# Patient Record
Sex: MALE | Race: OTHER | HISPANIC OR LATINO
[De-identification: names, ages, dates, MRNs, and addresses within clinical notes are randomized per-mention and may not be internally consistent; named-entity substitution may affect disease eponyms.]

---

## 2017-02-28 PROBLEM — Z00.00 ENCOUNTER FOR PREVENTIVE HEALTH EXAMINATION: Status: ACTIVE | Noted: 2017-02-28

## 2020-03-09 ENCOUNTER — APPOINTMENT (OUTPATIENT)
Dept: ORTHOPEDIC SURGERY | Facility: CLINIC | Age: 45
End: 2020-03-09
Payer: MEDICAID

## 2020-03-09 VITALS
OXYGEN SATURATION: 98 % | SYSTOLIC BLOOD PRESSURE: 129 MMHG | HEART RATE: 80 BPM | HEIGHT: 66 IN | DIASTOLIC BLOOD PRESSURE: 78 MMHG | WEIGHT: 210 LBS | BODY MASS INDEX: 33.75 KG/M2

## 2020-03-09 DIAGNOSIS — Z86.39 PERSONAL HISTORY OF OTHER ENDOCRINE, NUTRITIONAL AND METABOLIC DISEASE: ICD-10-CM

## 2020-03-09 DIAGNOSIS — Z87.2 PERSONAL HISTORY OF DISEASES OF THE SKIN AND SUBCUTANEOUS TISSUE: ICD-10-CM

## 2020-03-09 DIAGNOSIS — Z72.3 LACK OF PHYSICAL EXERCISE: ICD-10-CM

## 2020-03-09 DIAGNOSIS — Z78.9 OTHER SPECIFIED HEALTH STATUS: ICD-10-CM

## 2020-03-09 DIAGNOSIS — Z82.61 FAMILY HISTORY OF ARTHRITIS: ICD-10-CM

## 2020-03-09 DIAGNOSIS — F17.200 NICOTINE DEPENDENCE, UNSPECIFIED, UNCOMPLICATED: ICD-10-CM

## 2020-03-09 DIAGNOSIS — Z60.2 PROBLEMS RELATED TO LIVING ALONE: ICD-10-CM

## 2020-03-09 PROCEDURE — 99205 OFFICE O/P NEW HI 60 MIN: CPT | Mod: 25

## 2020-03-09 PROCEDURE — 72170 X-RAY EXAM OF PELVIS: CPT

## 2020-03-09 PROCEDURE — 73565 X-RAY EXAM OF KNEES: CPT

## 2020-03-09 PROCEDURE — 20610 DRAIN/INJ JOINT/BURSA W/O US: CPT | Mod: RT

## 2020-03-09 PROCEDURE — 73610 X-RAY EXAM OF ANKLE: CPT | Mod: RT

## 2020-03-09 SDOH — SOCIAL STABILITY - SOCIAL INSECURITY: PROBLEMS RELATED TO LIVING ALONE: Z60.2

## 2020-03-09 NOTE — PHYSICAL EXAM
[de-identified] : General appearance: well nourished and hydrated, pleasant, alert and oriented x 3, cooperative.  \par HEENT: normocephalic, EOM intact, nasal septum midline, oral cavity clear, external auditory canal clear.  \par Cardiovascular: no lower leg edema, no varicosities, dorsalis pedis pulses palpable and symmetric.  \par Lymphatics: no palpable lymphadenopathy, no lymphedema.  \par Neurologic: sensation is normal, no muscle weakness in upper or lower extremities, patella tendon reflexes present and symmetric.  \par Dermatologic: skin moist, warm. Scaly rash noted throughout right leg.  \par Spine: cervical spine with normal lordosis and painless range of motion, thoracic spine with normal kyphosis and painless range of motion, lumbosacral spine with normal lordosis and painless range of motion.\par Gait: normal.  \par \par Left knee:\par - Inspection: large effusion, negative soft tissue swelling, ecchymosis, and erythema.  \par - Wounds: healed arthroscopic portals, lateral parapatellar incision.\par - Alignment: normal.  \par - Palpation: medial joint line and peripatellar tenderness on palpation.  \par - ROM active: 0-110, pain on extremes of motion\par - Ligamentous laxity: negative Lachman, negative ant. drawer test, negative post. drawer test, negative pivot shift test, increased opening to varus stress with a firm endpoint, stable to valgus stress.  \par - Muscle Test: 5/5 quad strength.  \par \par Right knee:\par - Inspection: large effusion, negative soft tissue swelling, ecchymosis, and erythema.  \par - Wounds: healed arthroscopic portals.\par - Alignment: normal.  \par - Palpation: medial joint line and peripatellar tenderness on palpation.  \par - ROM active: 0-100, pain on extremes of motion\par - Ligamentous laxity: negative Lachman, negative ant. drawer test, negative post. drawer test, negative pivot shift test, stable to varus stress, slightly increased opening to valgus stress with a firm endpoint.  \par - Muscle Test: 5/5 quad strength.  \par \par Right ankle\par - Inspection: ankle effusion with negative swelling, ecchymosis, and erythema.  \par - Wounds: none.  \par - Palpation: joint line pain on palpation.  \par - ROM: FROM without crepitus.  \par - Strength: 5/5 plantarflexion and dorsiflexion.  \par - Stability: stable on anterior and posterior drawers, hindfoot inversion/eversion. [de-identified] : AP pelvis, 3 views of the right ankle (weightbearing AP, mortise, and lateral), and 4 views of the bilateral knees (weightbearing AP, weightbearing Moore, weightbearing lateral, and Sunrise) were obtained today and interpreted by me.\par \par Bilateral hips demonstrate normal alignment with mild superomedial joint space narrowing; no sclerosis/cysts/osteophytes. There is no proximal femoral or acetabular deformity. There is no fracture or prior fracture deformity. There is no radiographic evidence of osteonecrosis. There is a radiodense lesion in the left femoral metaphyseal region adjacent to the lesser trochanter,  possibly consistent with an osteofibrous or cartilaginous tumor.\par \par Bilateral sacroiliac joints appear normal without arthrosis.\par \par The left knee has a lateral unicondylar arthroplasty in place. The arthroplasty appears to be well fixed without evidence of fracture, osteolysis, or loosening. The knee demonstrates normal alignment in the coronal and sagittal planes. There is mild-moderate arthritis in the medial and patellofemoral compartments, Kellgren-Philippe 1-2. The patella sits at appropriate height and tracks centrally. Small lateral patellar osteophyte.\par \par The right knee demonstrates normal alignment in the coronal and sagittal planes. There is tricompartmental arthritis worse in the medial compartment, Kellgren-Philippe 3. There are cysts and sclerosis at the medial tibial plateau. The patella sits at appropriate height and tracks centrally. Small lateral patellar osteophyte.\par \par The right ankle demonstrates normal alignment without evidence of fracture or arthritis. Mortise appears normal. No syndesmotic widening. Minimal talonavicular arthrosis.

## 2020-03-09 NOTE — REASON FOR VISIT
[Initial Visit] : an initial visit for [Other: ____] : [unfilled] [ Service] : provided by  Service [FreeTextEntry1] : [id number] [FreeTextEntry2] : noman [TWNoteComboBox1] : [place]

## 2020-03-09 NOTE — PROCEDURE
[FreeTextEntry1] : Bilateral knee aspiration and right knee corticosteroid injection was performed. After preparation of superolateral portals with Betadine and alcohol, 18ga needles were used to withdraw approximately 40cc of blood-tinged synovial fluid from each knee. On the right side, the same needle was used to inject 9cc of 1% lidocaine and 1cc of Kenalog-40. The aspirates were sent for cell counts, cultures, and crystal analyses. Band-aids were applied. Patient tolerated the procedure well without complications.

## 2020-03-09 NOTE — REVIEW OF SYSTEMS
[Joint Pain] : joint pain [Urinary Frequency] : urinary frequency [Depression] : depression [Muscle Weakness] : muscle weakness [Negative] : Heme/Lymph

## 2020-03-09 NOTE — DISCUSSION/SUMMARY
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 Consider HA pending clinical progress.

## 2020-03-09 NOTE — HISTORY OF PRESENT ILLNESS
[Worsening] : worsening [6] : a current pain level of 6/10 [2] : a minimum pain level of 2/10 [10] : a maximum pain level of 10/10 [Bending] : worsened by bending [Direct Pressure] : worsened by direct pressure [Running] : worsened by running [Walking] : worsened by walking [Heat] : relieved by heat [Ice] : relieved by ice [NSAIDs] : relieved by nonsteroidal anti-inflammatory drugs [Rest] : relieved by rest [de-identified] : 45y/o male presenting for evaluation of polyarticular pain and swelling. These issues have been present for decades without specific injury or other inciting event. Issue lies chiefly with the knees as well as the right ankle. He has had four left knee arthroscopies as well as a left lateral unicondylar knee replacement in 2015 by Dr. Bowden. He had a right knee arthroscopy done "many years ago". None of the surgical procedures did anything to significantly relieve his symptoms. He has been through many rounds of CSI to both knees with limited temporary relief. The effusions always return within a couple of weeks. He has bilateral knee stiffness in flexion that has been present for about 5-6 years. The right ankle has swelling and pain that radiates to the third and fourth toes. He denies other joint involvement. He reports that he has been to see various doctors in various health systems and that a myriad of workup has all been negative. He specifically reports that Lyme testing has been negative. He is not 100% sure of the rest of the rheumatologic workup. He does have a history of psoriasis. He takes Aleve when needed for pain.\par \par Patient originally hails from [facility], currently works as a . Speaks some English but more comfortable with a Moroccan .\par \par Patient's name was incorrectly transcribed as "Edgard Lagunas"; correct spelling is Edgard Land. [de-identified] : sharp/burning/shooting

## 2020-03-10 LAB
B PERT IGG+IGM PNL SER: ABNORMAL
B PERT IGG+IGM PNL SER: ABNORMAL
COLOR FLD: NORMAL
COLOR FLD: NORMAL
FLUID INTAKE SUBSTANCE CLASS: NORMAL
FLUID INTAKE SUBSTANCE CLASS: NORMAL
LYMPHOCYTES # FLD MANUAL: 14 %
LYMPHOCYTES # FLD MANUAL: 16 %
MONOS+MACROS NFR FLD MANUAL: 18 %
MONOS+MACROS NFR FLD MANUAL: 20 %
NEUTS SEG # FLD MANUAL: 66 %
NEUTS SEG # FLD MANUAL: 66 %
RBC # FLD MANUAL: ABNORMAL /UL
RBC # FLD MANUAL: ABNORMAL /UL
SYCRY CLARITY: ABNORMAL
SYCRY CLARITY: ABNORMAL
SYCRY COLOR: ABNORMAL
SYCRY COLOR: ABNORMAL
SYCRY ID: ABNORMAL
SYCRY ID: ABNORMAL
SYCRY TUBE: NORMAL
SYCRY TUBE: NORMAL
TOTAL CELLS COUNTED FLD: 5990 /UL
TOTAL CELLS COUNTED FLD: 7043 /UL
TUBE TYPE: NORMAL
TUBE TYPE: NORMAL

## 2020-03-26 LAB
BACTERIA FLD CULT: NORMAL
BACTERIA FLD CULT: NORMAL

## 2020-04-06 ENCOUNTER — APPOINTMENT (OUTPATIENT)
Dept: ORTHOPEDIC SURGERY | Facility: CLINIC | Age: 45
End: 2020-04-06

## 2020-04-20 LAB
FUNGUS FLD CULT: NORMAL
FUNGUS FLD CULT: NORMAL

## 2020-05-18 ENCOUNTER — LABORATORY RESULT (OUTPATIENT)
Age: 45
End: 2020-05-18

## 2020-05-18 ENCOUNTER — APPOINTMENT (OUTPATIENT)
Dept: RHEUMATOLOGY | Facility: CLINIC | Age: 45
End: 2020-05-18
Payer: MEDICAID

## 2020-05-18 VITALS
OXYGEN SATURATION: 97 % | SYSTOLIC BLOOD PRESSURE: 110 MMHG | WEIGHT: 216 LBS | BODY MASS INDEX: 34.72 KG/M2 | TEMPERATURE: 98 F | RESPIRATION RATE: 12 BRPM | HEART RATE: 98 BPM | HEIGHT: 66 IN | DIASTOLIC BLOOD PRESSURE: 77 MMHG

## 2020-05-18 DIAGNOSIS — M13.0 POLYARTHRITIS, UNSPECIFIED: ICD-10-CM

## 2020-05-18 PROCEDURE — 99205 OFFICE O/P NEW HI 60 MIN: CPT

## 2020-05-19 NOTE — HISTORY OF PRESENT ILLNESS
[Arthralgias] : arthralgias [Joint Swelling] : joint swelling [Morning Stiffness] : morning stiffness [FreeTextEntry1] : 44 year old male presents for evaluation of bilateral knee pain. \par \par He has been having knee pain for many years - began with "wear and tear" of cartilage and then developed inflammation of both knees, had TKA approx. 4yrs ago in. L knee.\par Saw many orthopedists for evaluation of continued knee pain/swelling after surgery. Bilateral arthrocentesis by Dr. Fairchild 5/13 revealed CPPD crystals. He denied any pain relief from these, but states that the R knee felt better after having a depomedrol injection. Swelling recurred a few days later. \par \par Does not take anything for pain, rarely OTCs - ibuprofen/naprosyn will help. \par Used to take them more, but told by PCP that "one kidney is getting smaller" so he stopped this. \par +Stiffness in am\par +Swelling in knees only \par Had hyaluronic acid injections in knees approx 6mo ago but only had 2/3 injections  \par Has pain in achilles and under MTPs also. \par Sometimes base of CMC w/ pain \par \par + Psoriasis - lower back, scalp, \par \par No LBP \par No GI symptoms [Anorexia] : no anorexia [Weight Loss] : no weight loss [Malaise] : no malaise [Fever] : no fever [Fatigue] : no fatigue [Chills] : no chills [Malar Facial Rash] : no malar facial rash [Depression] : no depression [Skin Lesions] : no lesions [Skin Nodules] : no skin nodules [Oral Ulcers] : no oral ulcers [Cough] : no cough [Dysphagia] : no dysphagia [Dry Mouth] : no dry mouth [Shortness of Breath] : no shortness of breath [Chest Pain] : no chest pain [Joint Warmth] : no joint warmth [Joint Deformity] : no joint deformity [Decreased ROM] : no decreased range of motion [Falls] : no falls [Difficulty Standing] : no difficulty standing [Difficulty Walking] : no difficulty walking [Muscle Weakness] : no muscle weakness [Myalgias] : no myalgias [Muscle Spasms] : no muscle spasms [Visual Changes] : no visual changes [Muscle Cramping] : no muscle cramping [Eye Pain] : no eye pain [Eye Redness] : no eye redness [Dry Eyes] : no dry eyes

## 2020-05-19 NOTE — ASSESSMENT
[FreeTextEntry1] : 44 year old male presents for evaluation of bilateral knee pain. \par History of psoriasis also. \par Synovial fluid from knee aspirate reveals CPPD crystals in both knees, though they are extracellular - therefore there is no current inflammatory reaction to explain the pain. \par His achilles enthesitis, bilateral MTP pain/tenderness make a diagnosis of psoriatic arthritis likely. \par Check XRs, serologies and inflammatory markers today. \par DIscussed potential need for DMARD / biology therapy.

## 2020-05-19 NOTE — PHYSICAL EXAM
[General Appearance - Alert] : alert [General Appearance - In No Acute Distress] : in no acute distress [General Appearance - Well Nourished] : well nourished [General Appearance - Well Developed] : well developed [General Appearance - Well-Appearing] : healthy appearing [Sclera] : the sclera and conjunctiva were normal [Outer Ear] : the ears and nose were normal in appearance [Examination Of The Oral Cavity] : the lips and gums were normal [Nasal Cavity] : the nasal mucosa and septum were normal [Neck Appearance] : the appearance of the neck was normal [Respiration, Rhythm And Depth] : normal respiratory rhythm and effort [Auscultation Breath Sounds / Voice Sounds] : lungs were clear to auscultation bilaterally [Edema] : there was no peripheral edema [Bowel Sounds] : normal bowel sounds [] : no hepato-splenomegaly [Cervical Lymph Nodes Enlarged Posterior Bilaterally] : posterior cervical [Supraclavicular Lymph Nodes Enlarged Bilaterally] : supraclavicular [Cervical Lymph Nodes Enlarged Anterior Bilaterally] : anterior cervical [No Spinal Tenderness] : no spinal tenderness [Sensation] : the sensory exam was normal to light touch and pinprick [Motor Exam] : the motor exam was normal [Oriented To Time, Place, And Person] : oriented to person, place, and time [FreeTextEntry1] : psoriatic plaques in scalp and base of neck, back of R shin. No nail changes.

## 2020-05-21 DIAGNOSIS — R74.8 ABNORMAL LEVELS OF OTHER SERUM ENZYMES: ICD-10-CM

## 2020-05-21 LAB
ALBUMIN SERPL ELPH-MCNC: 4.6 G/DL
ALP BLD-CCNC: 80 U/L
ALT SERPL-CCNC: 23 U/L
ANA SER IF-ACNC: NEGATIVE
ANION GAP SERPL CALC-SCNC: 16 MMOL/L
AST SERPL-CCNC: 26 U/L
BASOPHILS # BLD AUTO: 0.04 K/UL
BASOPHILS NFR BLD AUTO: 0.4 %
BILIRUB SERPL-MCNC: 0.5 MG/DL
BUN SERPL-MCNC: 14 MG/DL
C TRACH RRNA SPEC QL NAA+PROBE: NOT DETECTED
CALCIUM SERPL-MCNC: 9.6 MG/DL
CALCIUM SERPL-MCNC: 9.6 MG/DL
CCP AB SER IA-ACNC: <8 UNITS
CHLORIDE SERPL-SCNC: 105 MMOL/L
CK SERPL-CCNC: 707 U/L
CO2 SERPL-SCNC: 22 MMOL/L
CREAT SERPL-MCNC: 0.92 MG/DL
CRP SERPL-MCNC: 1.78 MG/DL
EOSINOPHIL # BLD AUTO: 0.12 K/UL
EOSINOPHIL NFR BLD AUTO: 1.3 %
ERYTHROCYTE [SEDIMENTATION RATE] IN BLOOD BY WESTERGREN METHOD: 56 MM/HR
GLUCOSE SERPL-MCNC: 103 MG/DL
HCT VFR BLD CALC: 45.5 %
HGB BLD-MCNC: 14 G/DL
IGA SER QL IEP: 437 MG/DL
IGG SER QL IEP: 1472 MG/DL
IGM SER QL IEP: 89 MG/DL
IMM GRANULOCYTES NFR BLD AUTO: 0.3 %
LYMPHOCYTES # BLD AUTO: 2.1 K/UL
LYMPHOCYTES NFR BLD AUTO: 21.9 %
MAGNESIUM SERPL-MCNC: 2.2 MG/DL
MAN DIFF?: NORMAL
MCHC RBC-ENTMCNC: 29.8 PG
MCHC RBC-ENTMCNC: 30.8 GM/DL
MCV RBC AUTO: 96.8 FL
MONOCYTES # BLD AUTO: 0.66 K/UL
MONOCYTES NFR BLD AUTO: 6.9 %
N GONORRHOEA RRNA SPEC QL NAA+PROBE: NOT DETECTED
NEUTROPHILS # BLD AUTO: 6.62 K/UL
NEUTROPHILS NFR BLD AUTO: 69.2 %
PARATHYROID HORMONE INTACT: 19 PG/ML
PLATELET # BLD AUTO: 342 K/UL
POTASSIUM SERPL-SCNC: 4.3 MMOL/L
PROT SERPL-MCNC: 7.7 G/DL
RBC # BLD: 4.7 M/UL
RBC # FLD: 14.6 %
RF+CCP IGG SER-IMP: NEGATIVE
RHEUMATOID FACT SER QL: <10 IU/ML
SODIUM SERPL-SCNC: 142 MMOL/L
SOURCE AMPLIFICATION: NORMAL
TSH SERPL-ACNC: 3.04 UIU/ML
WBC # FLD AUTO: 9.57 K/UL

## 2020-06-05 LAB — HLA-B27 RELATED AG QL: NORMAL

## 2020-06-23 ENCOUNTER — APPOINTMENT (OUTPATIENT)
Dept: MRI IMAGING | Facility: HOSPITAL | Age: 45
End: 2020-06-23
Payer: MEDICAID

## 2020-06-23 ENCOUNTER — OUTPATIENT (OUTPATIENT)
Dept: OUTPATIENT SERVICES | Facility: HOSPITAL | Age: 45
LOS: 1 days | End: 2020-06-23
Payer: MEDICAID

## 2020-06-23 PROCEDURE — 73718 MRI LOWER EXTREMITY W/O DYE: CPT | Mod: 26,LT

## 2020-06-23 PROCEDURE — 73718 MRI LOWER EXTREMITY W/O DYE: CPT

## 2020-07-01 ENCOUNTER — APPOINTMENT (OUTPATIENT)
Dept: ORTHOPEDIC SURGERY | Facility: CLINIC | Age: 45
End: 2020-07-01
Payer: MEDICAID

## 2020-07-01 VITALS
DIASTOLIC BLOOD PRESSURE: 80 MMHG | HEIGHT: 66 IN | BODY MASS INDEX: 34.72 KG/M2 | OXYGEN SATURATION: 98 % | HEART RATE: 89 BPM | SYSTOLIC BLOOD PRESSURE: 122 MMHG | WEIGHT: 216 LBS

## 2020-07-01 DIAGNOSIS — M76.822 POSTERIOR TIBIAL TENDINITIS, LEFT LEG: ICD-10-CM

## 2020-07-01 PROCEDURE — 99214 OFFICE O/P EST MOD 30 MIN: CPT

## 2020-07-01 NOTE — PHYSICAL EXAM
[de-identified] : General appearance: well nourished and hydrated, pleasant, alert and oriented x 3, cooperative. \par HEENT: normocephalic, EOM intact, wearing mask, external auditory canal clear. \par Cardiovascular: no lower leg edema, no varicosities, dorsalis pedis pulses palpable and symmetric. \par Lymphatics: no palpable lymphadenopathy, no lymphedema. \par Neurologic: sensation is normal, no muscle weakness in upper or lower extremities, patella tendon reflexes present and symmetric. \par Dermatologic: skin moist, warm. No active rash.\par Spine: cervical spine with normal lordosis and painless range of motion, thoracic spine with normal kyphosis and painless range of motion, lumbosacral spine with normal lordosis and painless range of motion.\par Gait: normal. \par \par Left knee:\par - Inspection: large effusion, negative soft tissue swelling, ecchymosis, and erythema. \par - Wounds: healed arthroscopic portals, lateral parapatellar incision.\par - Alignment: normal. \par - Palpation: medial joint line and peripatellar tenderness on palpation. \par - ROM active: 0-110, pain on extremes of motion\par - Ligamentous laxity: negative Lachman, negative ant. drawer test, negative post. drawer test, negative pivot shift test, increased opening to varus stress with a firm endpoint, stable to valgus stress. \par - Muscle Test: 5/5 quad strength. \par \par Right knee:\par - Inspection: large effusion, negative soft tissue swelling, ecchymosis, and erythema. \par - Wounds: healed arthroscopic portals.\par - Alignment: normal. \par - Palpation: medial joint line and peripatellar tenderness on palpation. \par - ROM active: 0-100, pain on extremes of motion\par - Ligamentous laxity: negative Lachman, negative ant. drawer test, negative post. drawer test, negative pivot shift test, stable to varus stress, slightly increased opening to valgus stress with a firm endpoint. \par - Muscle Test: 5/5 quad strength. \par \par Left ankle: pain along course of posterior tibial tendon with visible synovitis. No elias pes planus deformity. Pain at PTT and lateral aspect subtalar joint with single leg heel raise.  [de-identified] : MRI of the left knee was reviewed from 6/23/20. 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

## 2020-07-01 NOTE — DISCUSSION/SUMMARY
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 Glover\par - Cont avoiding NSAIDs (kidney history), can use Tylenol as needed\par - Start PT, cont HEP\par - Reviewed the benefits of low impact exercise, daily ambulation\par - MRI right knee to evaluate for bilateral lipoma arborescens\par - Left UCBL and referral to Dr. Fay for the PTTI\par - Callback after MRI. If he has bilateral disease then would consider bilateral open synovectomy to hopefully arrest the progress of the disease. We discussed that this procedure would not address the degenerative changes that he has already developed, but could potentially slow its progression and allow for additional time prior to eventual TKA.

## 2020-07-01 NOTE — HISTORY OF PRESENT ILLNESS
[de-identified] : 46y/o male following up for bilateral knee inflammatory arthritis. Saw Dr. Glover whose impression was of psoriatic arthritis and started prednisone. She also obtained a left knee MRI that was done last week. He has been mostly noncompliant with the prednisone due to fear of possible side effects, though he states that all of his knee and ankle symptoms respond very well to the steroids when he does take them. The injection that we did at the last visit gave him roughly 4-5 weeks of relief. The bilateral knee effusions are back in full force. He has mild-moderate pain on a regular basis that does not limit his daily activities. He is asking about how much soccer is enough. There is also a new complaint of left posteromedial ankle pain.

## 2020-07-06 ENCOUNTER — APPOINTMENT (OUTPATIENT)
Dept: ORTHOPEDIC SURGERY | Facility: CLINIC | Age: 45
End: 2020-07-06
Payer: MEDICAID

## 2020-07-06 VITALS — BODY MASS INDEX: 34.72 KG/M2 | HEIGHT: 66 IN | RESPIRATION RATE: 16 BRPM | WEIGHT: 216 LBS

## 2020-07-06 DIAGNOSIS — M77.41 METATARSALGIA, RIGHT FOOT: ICD-10-CM

## 2020-07-06 DIAGNOSIS — M72.2 PLANTAR FASCIAL FIBROMATOSIS: ICD-10-CM

## 2020-07-06 DIAGNOSIS — M76.822 POSTERIOR TIBIAL TENDINITIS, LEFT LEG: ICD-10-CM

## 2020-07-06 DIAGNOSIS — M21.6X1 OTHER ACQUIRED DEFORMITIES OF RIGHT FOOT: ICD-10-CM

## 2020-07-06 DIAGNOSIS — M21.6X2 OTHER ACQUIRED DEFORMITIES OF LEFT FOOT: ICD-10-CM

## 2020-07-06 PROCEDURE — 99214 OFFICE O/P EST MOD 30 MIN: CPT

## 2020-07-06 RX ORDER — MELOXICAM 15 MG/1
15 TABLET ORAL
Qty: 30 | Refills: 2 | Status: ACTIVE | COMMUNITY
Start: 2020-07-06 | End: 1900-01-01

## 2020-07-06 RX ORDER — CETIRIZINE HYDROCHLORIDE 10 MG/1
10 TABLET, COATED ORAL
Qty: 30 | Refills: 0 | Status: ACTIVE | COMMUNITY
Start: 2020-05-21

## 2020-07-09 ENCOUNTER — APPOINTMENT (OUTPATIENT)
Dept: RHEUMATOLOGY | Facility: CLINIC | Age: 45
End: 2020-07-09
Payer: MEDICAID

## 2020-07-09 VITALS
TEMPERATURE: 99.3 F | DIASTOLIC BLOOD PRESSURE: 74 MMHG | SYSTOLIC BLOOD PRESSURE: 118 MMHG | BODY MASS INDEX: 34.63 KG/M2 | WEIGHT: 215.5 LBS | HEIGHT: 66 IN | HEART RATE: 85 BPM | OXYGEN SATURATION: 95 %

## 2020-07-09 PROCEDURE — 99214 OFFICE O/P EST MOD 30 MIN: CPT | Mod: 25

## 2020-07-09 PROCEDURE — 36415 COLL VENOUS BLD VENIPUNCTURE: CPT

## 2020-07-09 NOTE — PHYSICAL EXAM
[General Appearance - Alert] : alert [General Appearance - Well Nourished] : well nourished [General Appearance - In No Acute Distress] : in no acute distress [General Appearance - Well Developed] : well developed [General Appearance - Well-Appearing] : healthy appearing [Sclera] : the sclera and conjunctiva were normal [Nasal Cavity] : the nasal mucosa and septum were normal [Outer Ear] : the ears and nose were normal in appearance [Examination Of The Oral Cavity] : the lips and gums were normal [Neck Appearance] : the appearance of the neck was normal [Respiration, Rhythm And Depth] : normal respiratory rhythm and effort [Auscultation Breath Sounds / Voice Sounds] : lungs were clear to auscultation bilaterally [Bowel Sounds] : normal bowel sounds [Edema] : there was no peripheral edema [Cervical Lymph Nodes Enlarged Posterior Bilaterally] : posterior cervical [] : no hepato-splenomegaly [Cervical Lymph Nodes Enlarged Anterior Bilaterally] : anterior cervical [Supraclavicular Lymph Nodes Enlarged Bilaterally] : supraclavicular [No Spinal Tenderness] : no spinal tenderness [Motor Exam] : the motor exam was normal [Sensation] : the sensory exam was normal to light touch and pinprick [Oriented To Time, Place, And Person] : oriented to person, place, and time [FreeTextEntry1] : psoriatic plaques in scalp and base of neck, back of R shin. No nail changes.

## 2020-07-09 NOTE — ASSESSMENT
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 His lipoma arborescens is likely the cause of his knee pain and swelling, and ongoing Achilles tendon pain 2/2 PsA. \par Still unclear significance of elevated CK, will continue to trend. \par Await contralateral MRI of knee - following surgical remove of lipoma arborescens will consider therapy for PsA since this is the likely cause - Otezla will be the best option given predominantly peripheral joint involvement. \par Prednisone 10mg daily \par No NSAIDs while on steroids - voltaren gel ordered\par Labs today\par

## 2020-07-09 NOTE — HISTORY OF PRESENT ILLNESS
[Arthralgias] : arthralgias [Joint Swelling] : joint swelling [Morning Stiffness] : morning stiffness [FreeTextEntry1] : Initial Visit:\par He has been having knee pain for many years - began with "wear and tear" of cartilage and then developed inflammation of both knees, had TKA approx. 4yrs ago in. L knee.\par Saw many orthopedists for evaluation of continued knee pain/swelling after surgery. Bilateral arthrocentesis by Dr. Fairchild 5/13 revealed CPPD crystals. He denied any pain relief from these, but states that the R knee felt better after having a depomedrol injection. Swelling recurred a few days later. \par Does not take anything for pain, rarely OTCs - ibuprofen/naprosyn will help. \par Used to take them more, but told by PCP that "one kidney is getting smaller" so he stopped this. \par +Stiffness in am\par +Swelling in knees only \par Had hyaluronic acid injections in knees approx 6mo ago but only had 2/3 injections  \par Has pain in achilles and under MTPs also. \par Sometimes base of CMC w/ pain \par + Psoriasis - lower back, scalp, \par No LBP \par No GI symptoms\par Plan: History of psoriasis also. \par Synovial fluid from knee aspirate reveals CPPD crystals in both knees, though they are extracellular - therefore there is no current inflammatory reaction to explain the pain. \par His achilles enthesitis, bilateral MTP pain/tenderness make a diagnosis of psoriatic arthritis likely. \par Check XRs, serologies and inflammatory markers today. \par DIscussed potential need for DMARD / biology therapy.  [Anorexia] : no anorexia [Weight Loss] : no weight loss [Malaise] : no malaise [Fever] : no fever [Chills] : no chills [Fatigue] : no fatigue [Depression] : no depression [Malar Facial Rash] : no malar facial rash [Skin Lesions] : no lesions [Skin Nodules] : no skin nodules [Oral Ulcers] : no oral ulcers [Dry Mouth] : no dry mouth [Cough] : no cough [Shortness of Breath] : no shortness of breath [Dysphagia] : no dysphagia [Chest Pain] : no chest pain [Joint Warmth] : no joint warmth [Joint Deformity] : no joint deformity [Falls] : no falls [Decreased ROM] : no decreased range of motion [Difficulty Standing] : no difficulty standing [Difficulty Walking] : no difficulty walking [Myalgias] : no myalgias [Muscle Spasms] : no muscle spasms [Muscle Weakness] : no muscle weakness [Eye Pain] : no eye pain [Visual Changes] : no visual changes [Muscle Cramping] : no muscle cramping [Eye Redness] : no eye redness [Dry Eyes] : no dry eyes

## 2020-07-10 LAB
ALBUMIN SERPL ELPH-MCNC: 4.6 G/DL
ALP BLD-CCNC: 107 U/L
ALT SERPL-CCNC: 17 U/L
ANION GAP SERPL CALC-SCNC: 19 MMOL/L
AST SERPL-CCNC: 17 U/L
BASOPHILS # BLD AUTO: 0.04 K/UL
BASOPHILS NFR BLD AUTO: 0.4 %
BILIRUB SERPL-MCNC: 0.6 MG/DL
BUN SERPL-MCNC: 18 MG/DL
CALCIUM SERPL-MCNC: 9.5 MG/DL
CHLORIDE SERPL-SCNC: 106 MMOL/L
CK SERPL-CCNC: 145 U/L
CO2 SERPL-SCNC: 19 MMOL/L
CREAT SERPL-MCNC: 1.25 MG/DL
CRP SERPL-MCNC: 0.81 MG/DL
EOSINOPHIL # BLD AUTO: 0.19 K/UL
EOSINOPHIL NFR BLD AUTO: 1.7 %
ERYTHROCYTE [SEDIMENTATION RATE] IN BLOOD BY WESTERGREN METHOD: 52 MM/HR
GLUCOSE SERPL-MCNC: 98 MG/DL
HCT VFR BLD CALC: 50.9 %
HGB BLD-MCNC: 15.1 G/DL
IMM GRANULOCYTES NFR BLD AUTO: 0.8 %
LYMPHOCYTES # BLD AUTO: 2.75 K/UL
LYMPHOCYTES NFR BLD AUTO: 25 %
MAN DIFF?: NORMAL
MCHC RBC-ENTMCNC: 29.7 GM/DL
MCHC RBC-ENTMCNC: 30 PG
MCV RBC AUTO: 101 FL
MONOCYTES # BLD AUTO: 0.66 K/UL
MONOCYTES NFR BLD AUTO: 6 %
NEUTROPHILS # BLD AUTO: 7.29 K/UL
NEUTROPHILS NFR BLD AUTO: 66.1 %
PLATELET # BLD AUTO: 307 K/UL
POTASSIUM SERPL-SCNC: 4.4 MMOL/L
PROT SERPL-MCNC: 7.5 G/DL
RBC # BLD: 5.04 M/UL
RBC # FLD: 15.9 %
SODIUM SERPL-SCNC: 143 MMOL/L
WBC # FLD AUTO: 11.02 K/UL

## 2020-07-17 ENCOUNTER — APPOINTMENT (OUTPATIENT)
Dept: ORTHOPEDIC SURGERY | Facility: CLINIC | Age: 45
End: 2020-07-17
Payer: MEDICAID

## 2020-07-17 PROCEDURE — 99214 OFFICE O/P EST MOD 30 MIN: CPT

## 2020-07-17 NOTE — REVIEW OF SYSTEMS
[Feeling Tired] : feeling tired [Joint Stiffness] : joint stiffness [Feeling Weak] : feeling week [Nl] : Endocrine

## 2020-07-17 NOTE — DATA REVIEWED
[Imaging Present] : Present [de-identified] : X-rays done on the outside show bilateral knee arthritis with the left knee already having a unicondylar knee replacement.  The right side has degenerative arthritis with decreased joint space as well as significant \par subchondral cysts in the medial plateau with some osteophytes.\par \par MRI of the right knee from 7/9/2020 shows mild degeneration of the anterior horn medial meniscus and complete discoid lateral meniscus, moderate degenerative changes incomplete trabecular stress fracture, moderate to large joint effusion as well as significant lipoma arborescens.\par \par MRI of the left knee from 6/23/2020:\par IMPRESSION: Large joint effusion with synovitis and lipomatosis arborescens \par of the suprapatellar recess. \par \par Hemiarthroplasty of the lateral compartment.

## 2020-07-17 NOTE — DISCUSSION/SUMMARY
[All Questions Answered] : Patient (and family) had all questions answered to an agreeable level of satisfaction [Surgical risks reviewed] : Surgical risks reviewed [de-identified] : I had a long discussion with the patient through the  video.  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  He has it both in the front and back which is atypical however he also may have rheumatologic problem such as psoriatic arthritis.  In any event he wants to have this out open rather than arthroscopic.  We discussed arthroscopy being a small procedure with less recovery however it is less likely to do more resection.  We discussed that we cannot get everything from the front however the front would be more of a resection at this point and when he has his arthroplasty some point in the future he can have the back done as well.  Both knees hurt equally however he wants to start with the right knee.  This will get him back to driving soon on the left knee can be done whenever after a few months should he heal quickly.  He understands he still may have some pain and swelling afterwards however I think this will help get rid of the masses appropriately.  We will plan for surgery in the near future.\par \par \par If imaging was ordered, the patient was told to make an appointment to review findings right after all imaging is completed.\par \par We discussed risks, benefits and alternatives. Rationale of care was reviewed and all questions were answered. Patient (and family) had all questions answered to her degree of the level of satisfaction. Patient (and family) expressed understanding and interest in proceeding with the plan as outlined.\par \par \par \par \par This note was done with a voice recognition transcription software and any typos are related to this rather than medical error. Surgical risks reviewed. Patient (and family) had all questions answered to an agreeable level of satisfaction. Patient (and family) expressed understanding and interest in proceeding with the plan as outlined.  \par  [Interested in Proceeding] : Patient (and family) expressed understanding and interest in proceeding with the plan as outlined

## 2020-07-17 NOTE — HISTORY OF PRESENT ILLNESS
[FreeTextEntry1] : This is a 45-year-old gentleman who has a history of a left knee unicondylar arthroplasty because of significant pain in both knees.  He has had arthroscopy of both knees in the past however still having significant pain.  He was seen by an arthroplasty surgeon who had MRIs of both knees showing significant lipoma arborescens as well as arthritis.  He was sent to me for possible evaluation of the synovial process.  He has pain in both his knees with limited range of motion.  He has pain in the back of both knees as well as anteriorly.  He feels unsteady when he is walking.  He has recurrent effusions in both knees. [Stable] : stable [Bending] : worsened by bending [3] : currently ~his/her~ pain is 3 out of 10 [Direct Pressure] : worsened by direct pressure [Walking] : worsened by walking

## 2020-07-17 NOTE — REASON FOR VISIT
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 Fairchild.\par

## 2020-07-17 NOTE — PHYSICAL EXAM
[General Appearance - Well-Appearing] : Well appearing [General Appearance - Well Nourished] : well nourished [Neck Cervical Mass (___cm)] : no neck mass was observed [FreeTextEntry1] : Anxious about medical condition [Sclera] : the sclera and conjunctiva were normal [Heart Rate And Rhythm] : heart rate was normal and rhythm regular [Abdomen Soft] : Soft [] : No respiratory distress [Ataxic] : ataxic [Swelling] : swelling [Tenderness] : tenderness [Skin Changes - Describe changes:] : No skin changes noted [Incision Healed - Describe:] : Incision is healed ~M [Erythema] : no erythema [Full ROM Unless otherwise noted:] : Full range of motion unless otherwise noted: [LE  Motor Strength Normal unless otherwise noted:] : 5/5 strength in bilateral lower extemities unless otherwise noted. [Normal] : Sensation intact to light touch. [2+] : left 2+

## 2020-07-21 ENCOUNTER — APPOINTMENT (OUTPATIENT)
Dept: DISASTER EMERGENCY | Facility: CLINIC | Age: 45
End: 2020-07-21

## 2020-07-21 DIAGNOSIS — Z01.818 ENCOUNTER FOR OTHER PREPROCEDURAL EXAMINATION: ICD-10-CM

## 2020-07-22 LAB — SARS-COV-2 N GENE NPH QL NAA+PROBE: NOT DETECTED

## 2020-07-23 ENCOUNTER — TRANSCRIPTION ENCOUNTER (OUTPATIENT)
Age: 45
End: 2020-07-23

## 2020-07-24 ENCOUNTER — APPOINTMENT (OUTPATIENT)
Dept: ORTHOPEDIC SURGERY | Facility: AMBULATORY SURGERY CENTER | Age: 45
End: 2020-07-24

## 2020-07-24 ENCOUNTER — OUTPATIENT (OUTPATIENT)
Dept: OUTPATIENT SERVICES | Facility: HOSPITAL | Age: 45
LOS: 1 days | Discharge: HOME | End: 2020-07-24
Payer: MEDICAID

## 2020-07-24 ENCOUNTER — RESULT REVIEW (OUTPATIENT)
Age: 45
End: 2020-07-24

## 2020-07-24 PROCEDURE — 88305 TISSUE EXAM BY PATHOLOGIST: CPT | Mod: 26

## 2020-07-24 PROCEDURE — 27334 REMOVE KNEE JOINT LINING: CPT | Mod: RT

## 2020-08-03 DIAGNOSIS — E78.5 HYPERLIPIDEMIA, UNSPECIFIED: ICD-10-CM

## 2020-08-03 DIAGNOSIS — M65.861 OTHER SYNOVITIS AND TENOSYNOVITIS, RIGHT LOWER LEG: ICD-10-CM

## 2020-08-03 DIAGNOSIS — Z72.0 TOBACCO USE: ICD-10-CM

## 2020-08-03 LAB — SURGICAL PATHOLOGY STUDY: SIGNIFICANT CHANGE UP

## 2020-08-07 ENCOUNTER — APPOINTMENT (OUTPATIENT)
Dept: ORTHOPEDIC SURGERY | Facility: CLINIC | Age: 45
End: 2020-08-07
Payer: MEDICAID

## 2020-08-07 PROCEDURE — 99024 POSTOP FOLLOW-UP VISIT: CPT

## 2020-08-07 NOTE — HISTORY OF PRESENT ILLNESS
[___ Weeks Post Op] : [unfilled] weeks post op [3] : the patient reports pain that is 3/10 in severity [Chills] : no chills [Fever] : no fever [Slow Progress] : is progressing slowly [Adequate Pain Control] : has adequate pain control [de-identified] : 7/24/2020 - R knee anterior open synovectomy [de-identified] : Patient is still in significant pain but has significant swelling.  He has been moving around with the crutches.  He has had some itching as well.  He knows of no allergies in particular. [de-identified] : On exam he does have some minimal rash in the area possibly from the knee immobilizer.  The incision is clean dry and intact.  Steri-Strips were applied.  Range of motion is 0 to 50 degrees.  He has a significant effusion from hemarthrosis. [de-identified] : Pathology was synovium with marked reactive and degenerative changes,\par spotty hemosiderin deposition, lymphoid aggregates, and lobulated\par partially infarcted adipose tissue with focal chronic active\par inflammation. [de-identified] : 2 weeks postop from right knee synovectomy.  I attempted to aspirate some hematoma but is all clot is only 10 cc of old blood came out.  I want to start physical therapy in the meantime. [de-identified] : Physical therapy was written as was tramadol and Benadryl.  I will see him back in 4 weeks.  He is already going to be seeing his rheumatologist who can start him on some new medication.\par \par If imaging was ordered, the patient was told to make an appointment to review findings right after all imaging is completed.\par \par We discussed risks, benefits and alternatives. Rationale of care was reviewed and all questions were answered. Patient (and family) had all questions answered to her degree of the level of satisfaction. Patient (and family) expressed understanding and interest in proceeding with the plan as outlined.\par \par \par \par \par This note was done with a voice recognition transcription software and any typos are related to this rather than medical error. Surgical risks reviewed. Patient (and family) had all questions answered to an agreeable level of satisfaction. Patient (and family) expressed understanding and interest in proceeding with the plan as outlined.  \par

## 2020-08-20 ENCOUNTER — APPOINTMENT (OUTPATIENT)
Dept: RHEUMATOLOGY | Facility: CLINIC | Age: 45
End: 2020-08-20
Payer: MEDICAID

## 2020-08-20 VITALS
WEIGHT: 210 LBS | SYSTOLIC BLOOD PRESSURE: 127 MMHG | DIASTOLIC BLOOD PRESSURE: 73 MMHG | HEART RATE: 92 BPM | TEMPERATURE: 98.6 F | OXYGEN SATURATION: 98 % | HEIGHT: 66 IN | BODY MASS INDEX: 33.75 KG/M2

## 2020-08-20 PROCEDURE — 99214 OFFICE O/P EST MOD 30 MIN: CPT | Mod: 25

## 2020-08-20 PROCEDURE — 36415 COLL VENOUS BLD VENIPUNCTURE: CPT

## 2020-08-20 NOTE — PHYSICAL EXAM
[General Appearance - In No Acute Distress] : in no acute distress [General Appearance - Alert] : alert [General Appearance - Well-Appearing] : healthy appearing [General Appearance - Well Developed] : well developed [General Appearance - Well Nourished] : well nourished [Outer Ear] : the ears and nose were normal in appearance [Sclera] : the sclera and conjunctiva were normal [Examination Of The Oral Cavity] : the lips and gums were normal [Nasal Cavity] : the nasal mucosa and septum were normal [Respiration, Rhythm And Depth] : normal respiratory rhythm and effort [Neck Appearance] : the appearance of the neck was normal [Edema] : there was no peripheral edema [Bowel Sounds] : normal bowel sounds [Auscultation Breath Sounds / Voice Sounds] : lungs were clear to auscultation bilaterally [Cervical Lymph Nodes Enlarged Posterior Bilaterally] : posterior cervical [] : no hepato-splenomegaly [Cervical Lymph Nodes Enlarged Anterior Bilaterally] : anterior cervical [Supraclavicular Lymph Nodes Enlarged Bilaterally] : supraclavicular [No Spinal Tenderness] : no spinal tenderness [Oriented To Time, Place, And Person] : oriented to person, place, and time [Sensation] : the sensory exam was normal to light touch and pinprick [Motor Exam] : the motor exam was normal [FreeTextEntry1] : psoriatic plaques in scalp and base of neck, back of R shin improved from prior. No nail changes.

## 2020-08-20 NOTE — HISTORY OF PRESENT ILLNESS
[Arthralgias] : arthralgias [Joint Swelling] : joint swelling [Morning Stiffness] : morning stiffness [FreeTextEntry1] : Office Visit 7/9/20:\par Feels better on Prednisone - Skips some days though and then notes his pain is worse.\par Awaiting MRI L knee per Dr. 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 Reported association w/ PsA\par Feels about 50% better - still pain in Achilles tendon \par Plan: Doing well overall with improvement on Prednisone. His lipoma arborescens is likely the cause of his knee pain and swelling, and ongoing Achilles tendon pain 2/2 PsA. \par Still unclear significance of elevated CK, will continue to trend. \par Await contralateral MRI of knee - following surgical remove of lipoma arborescens will consider therapy for PsA since this is the likely cause - Otezla will be the best option given predominantly peripheral joint involvement. \par Prednisone 10mg daily \par No NSAIDs while on steroids - voltaren gel ordered\par Labs today\par \par Initial Visit:\par He has been having knee pain for many years - began with "wear and tear" of cartilage and then developed inflammation of both knees, had TKA approx. 4yrs ago in. L knee.\par Saw many orthopedists for evaluation of continued knee pain/swelling after surgery. Bilateral arthrocentesis by Dr. Fairchild 5/13 revealed CPPD crystals. He denied any pain relief from these, but states that the R knee felt better after having a depomedrol injection. Swelling recurred a few days later. \par Does not take anything for pain, rarely OTCs - ibuprofen/naprosyn will help. \par Used to take them more, but told by PCP that "one kidney is getting smaller" so he stopped this. \par +Stiffness in am\par +Swelling in knees only \par Had hyaluronic acid injections in knees approx 6mo ago but only had 2/3 injections  \par Has pain in achilles and under MTPs also. \par Sometimes base of CMC w/ pain \par + Psoriasis - lower back, scalp, \par No LBP \par No GI symptoms\par Plan: History of psoriasis also. \par Synovial fluid from knee aspirate reveals CPPD crystals in both knees, though they are extracellular - therefore there is no current inflammatory reaction to explain the pain. \par His achilles enthesitis, bilateral MTP pain/tenderness make a diagnosis of psoriatic arthritis likely. \par Check XRs, serologies and inflammatory markers today. \par DIscussed potential need for DMARD / biology therapy.  [Weight Loss] : no weight loss [Anorexia] : no anorexia [Malaise] : no malaise [Fever] : no fever [Chills] : no chills [Fatigue] : no fatigue [Depression] : no depression [Malar Facial Rash] : no malar facial rash [Skin Lesions] : no lesions [Skin Nodules] : no skin nodules [Oral Ulcers] : no oral ulcers [Cough] : no cough [Dry Mouth] : no dry mouth [Dysphagia] : no dysphagia [Shortness of Breath] : no shortness of breath [Chest Pain] : no chest pain [Joint Warmth] : no joint warmth [Joint Deformity] : no joint deformity [Decreased ROM] : no decreased range of motion [Falls] : no falls [Difficulty Standing] : no difficulty standing [Difficulty Walking] : no difficulty walking [Myalgias] : no myalgias [Muscle Spasms] : no muscle spasms [Muscle Weakness] : no muscle weakness [Muscle Cramping] : no muscle cramping [Visual Changes] : no visual changes [Eye Pain] : no eye pain [Eye Redness] : no eye redness [Dry Eyes] : no dry eyes

## 2020-08-20 NOTE — ASSESSMENT
[FreeTextEntry1] : 45 year old male presents for follow up of bilateral knee pain 2/2 lipoma arborescens and suspected PsA\par Now with new effusion s/p biopsy July 21 - will d/w Dr. Thomas \par Start Enbrel once knee is evaluated to rule out infection. Consider medrol dosepack also. \par Check hepatitis serologies and quantiferon today \par

## 2020-08-25 LAB
ALBUMIN SERPL ELPH-MCNC: 4.6 G/DL
ALP BLD-CCNC: 88 U/L
ALT SERPL-CCNC: 13 U/L
ANION GAP SERPL CALC-SCNC: 11 MMOL/L
AST SERPL-CCNC: 17 U/L
BASOPHILS # BLD AUTO: 0.04 K/UL
BASOPHILS NFR BLD AUTO: 0.4 %
BILIRUB SERPL-MCNC: 0.7 MG/DL
BUN SERPL-MCNC: 12 MG/DL
CALCIUM SERPL-MCNC: 9.7 MG/DL
CHLORIDE SERPL-SCNC: 102 MMOL/L
CK SERPL-CCNC: 98 U/L
CO2 SERPL-SCNC: 27 MMOL/L
CREAT SERPL-MCNC: 0.97 MG/DL
CRP SERPL-MCNC: 1.59 MG/DL
EOSINOPHIL # BLD AUTO: 0.24 K/UL
EOSINOPHIL NFR BLD AUTO: 2.6 %
ERYTHROCYTE [SEDIMENTATION RATE] IN BLOOD BY WESTERGREN METHOD: 48 MM/HR
GLUCOSE SERPL-MCNC: 158 MG/DL
HBV CORE IGG+IGM SER QL: NONREACTIVE
HBV SURFACE AB SER QL: NONREACTIVE
HBV SURFACE AG SER QL: NONREACTIVE
HCT VFR BLD CALC: 46 %
HCV AB SER QL: NONREACTIVE
HCV S/CO RATIO: 0.15 S/CO
HGB BLD-MCNC: 13.5 G/DL
IMM GRANULOCYTES NFR BLD AUTO: 0.4 %
LYMPHOCYTES # BLD AUTO: 2.48 K/UL
LYMPHOCYTES NFR BLD AUTO: 26.4 %
M TB IFN-G BLD-IMP: NEGATIVE
MAN DIFF?: NORMAL
MCHC RBC-ENTMCNC: 29.3 GM/DL
MCHC RBC-ENTMCNC: 29.6 PG
MCV RBC AUTO: 100.9 FL
MONOCYTES # BLD AUTO: 0.47 K/UL
MONOCYTES NFR BLD AUTO: 5 %
NEUTROPHILS # BLD AUTO: 6.12 K/UL
NEUTROPHILS NFR BLD AUTO: 65.2 %
PLATELET # BLD AUTO: 433 K/UL
POTASSIUM SERPL-SCNC: 4.5 MMOL/L
PROT SERPL-MCNC: 7.7 G/DL
QUANTIFERON TB PLUS MITOGEN MINUS NIL: 7.41 IU/ML
QUANTIFERON TB PLUS NIL: 0.01 IU/ML
QUANTIFERON TB PLUS TB1 MINUS NIL: 0.01 IU/ML
QUANTIFERON TB PLUS TB2 MINUS NIL: 0.01 IU/ML
RBC # BLD: 4.56 M/UL
RBC # FLD: 14.3 %
SODIUM SERPL-SCNC: 141 MMOL/L
WBC # FLD AUTO: 9.39 K/UL

## 2020-09-04 ENCOUNTER — APPOINTMENT (OUTPATIENT)
Dept: ORTHOPEDIC SURGERY | Facility: CLINIC | Age: 45
End: 2020-09-04
Payer: MEDICAID

## 2020-09-04 DIAGNOSIS — D17.20 BENIGN LIPOMATOUS NEOPLASM OF SKIN AND SUBCUTANEOUS TISSUE OF UNSPECIFIED LIMB: ICD-10-CM

## 2020-09-04 PROCEDURE — 99024 POSTOP FOLLOW-UP VISIT: CPT

## 2020-09-08 ENCOUNTER — APPOINTMENT (OUTPATIENT)
Dept: RHEUMATOLOGY | Facility: CLINIC | Age: 45
End: 2020-09-08
Payer: MEDICAID

## 2020-09-08 PROCEDURE — 99213 OFFICE O/P EST LOW 20 MIN: CPT

## 2020-09-08 NOTE — ASSESSMENT
[FreeTextEntry1] : 45 year old male presents for follow up of bilateral knee pain 2/2 lipoma arborescens and suspected PsA\par Discussed starting Enbrel today, showed patient how to administer \par

## 2020-09-08 NOTE — HISTORY OF PRESENT ILLNESS
[FreeTextEntry1] : Office Visit 8/20/20:\par Had biopsy for lipoma aborescens last month - Knee pain persists since surgery, notes new effusion developed in the past week \par Biopsy with non specific chronic inflammatory changes\par Psoriasis well controlled\par No other joint pain \par Discussed starting Enbrel today, side effects explained \par Plan: Now with new effusion s/p biopsy July 21 - will d/w Dr. Thomas \par Start Enbrel once knee is evaluated to rule out infection. Consider medrol dosepack also. \par Check hepatitis serologies and quantiferon today \par \par Office Visit 7/9/20:\par Feels better on Prednisone - Skips some days though and then notes his pain is worse.\par Awaiting MRI L knee per Dr. 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 Reported association w/ PsA\par Feels about 50% better - still pain in Achilles tendon \par Plan: Doing well overall with improvement on Prednisone. His lipoma arborescens is likely the cause of his knee pain and swelling, and ongoing Achilles tendon pain 2/2 PsA. \par Still unclear significance of elevated CK, will continue to trend. \par Await contralateral MRI of knee - following surgical remove of lipoma arborescens will consider therapy for PsA since this is the likely cause - Otezla will be the best option given predominantly peripheral joint involvement. \par Prednisone 10mg daily \par No NSAIDs while on steroids - voltaren gel ordered\par Labs today\par \par Initial Visit:\par He has been having knee pain for many years - began with "wear and tear" of cartilage and then developed inflammation of both knees, had TKA approx. 4yrs ago in. L knee.\par Saw many orthopedists for evaluation of continued knee pain/swelling after surgery. Bilateral arthrocentesis by Dr. Fairchild 5/13 revealed CPPD crystals. He denied any pain relief from these, but states that the R knee felt better after having a depomedrol injection. Swelling recurred a few days later. \par Does not take anything for pain, rarely OTCs - ibuprofen/naprosyn will help. \par Used to take them more, but told by PCP that "one kidney is getting smaller" so he stopped this. \par +Stiffness in am\par +Swelling in knees only \par Had hyaluronic acid injections in knees approx 6mo ago but only had 2/3 injections  \par Has pain in achilles and under MTPs also. \par Sometimes base of CMC w/ pain \par + Psoriasis - lower back, scalp, \par No LBP \par No GI symptoms\par Plan: History of psoriasis also. \par Synovial fluid from knee aspirate reveals CPPD crystals in both knees, though they are extracellular - therefore there is no current inflammatory reaction to explain the pain. \par His achilles enthesitis, bilateral MTP pain/tenderness make a diagnosis of psoriatic arthritis likely. \par Check XRs, serologies and inflammatory markers today. \par DIscussed potential need for DMARD / biology therapy.  [Weight Loss] : no weight loss [Anorexia] : no anorexia [Chills] : no chills [Fever] : no fever [Malaise] : no malaise [Depression] : no depression [Fatigue] : no fatigue [Malar Facial Rash] : no malar facial rash [Skin Lesions] : no lesions [Skin Nodules] : no skin nodules [Oral Ulcers] : no oral ulcers [Dysphagia] : no dysphagia [Dry Mouth] : no dry mouth [Cough] : no cough [Chest Pain] : no chest pain [Arthralgias] : arthralgias [Shortness of Breath] : no shortness of breath [Joint Warmth] : no joint warmth [Joint Deformity] : no joint deformity [Joint Swelling] : joint swelling [Decreased ROM] : no decreased range of motion [Morning Stiffness] : morning stiffness [Falls] : no falls [Difficulty Standing] : no difficulty standing [Myalgias] : no myalgias [Difficulty Walking] : no difficulty walking [Muscle Cramping] : no muscle cramping [Muscle Weakness] : no muscle weakness [Muscle Spasms] : no muscle spasms [Eye Pain] : no eye pain [Visual Changes] : no visual changes [Eye Redness] : no eye redness [Dry Eyes] : no dry eyes

## 2020-09-08 NOTE — PHYSICAL EXAM
[General Appearance - Well Nourished] : well nourished [General Appearance - Alert] : alert [General Appearance - In No Acute Distress] : in no acute distress [General Appearance - Well Developed] : well developed [General Appearance - Well-Appearing] : healthy appearing [Sclera] : the sclera and conjunctiva were normal [Outer Ear] : the ears and nose were normal in appearance [Examination Of The Oral Cavity] : the lips and gums were normal [Nasal Cavity] : the nasal mucosa and septum were normal [Neck Appearance] : the appearance of the neck was normal [Respiration, Rhythm And Depth] : normal respiratory rhythm and effort [Auscultation Breath Sounds / Voice Sounds] : lungs were clear to auscultation bilaterally [] : no hepato-splenomegaly [Bowel Sounds] : normal bowel sounds [Edema] : there was no peripheral edema [Cervical Lymph Nodes Enlarged Posterior Bilaterally] : posterior cervical [Cervical Lymph Nodes Enlarged Anterior Bilaterally] : anterior cervical [No Spinal Tenderness] : no spinal tenderness [Supraclavicular Lymph Nodes Enlarged Bilaterally] : supraclavicular [FreeTextEntry1] : R knee with warmth/tenderness and suprapatellar effusion, unable to extend  [Sensation] : the sensory exam was normal to light touch and pinprick [Oriented To Time, Place, And Person] : oriented to person, place, and time [Motor Exam] : the motor exam was normal

## 2020-09-11 PROBLEM — D17.20 BENIGN LIPOMATOUS NEOPLASM OF SKIN AND SUBCUTANEOUS TISSUE OF UNSPECIFIED LIMB: Status: ACTIVE | Noted: 2020-06-25

## 2020-09-11 NOTE — HISTORY OF PRESENT ILLNESS
[___ Weeks Post Op] : [unfilled] weeks post op [1] : the patient reports pain that is 1/10 in severity [Chills] : no chills [Fever] : no fever [Slow Progress] : is progressing slowly [Adequate Pain Control] : has adequate pain control [de-identified] : 7/24/2020 - R knee anterior open synovectomy [de-identified] : Patient is still in significant pain but decreased from before.  He also has significantly less swelling than before.  He is getting ready to start Enbrel. [de-identified] : On exam h his rash seems to have gone away.  Incision is clean dry and intact.  Range of motion is 0 to 80 degrees however I can push him to past 90.  He has significantly less effusion than before. [de-identified] : 6 weeks postop from right knee synovectomy.  He does not have anything to aspirate at this point.  I think he is free to start Enbrel.  He needs to do more physical therapy as he is still quite weak and stiff.  He understands if he does not do this he will get even stiffer.  He is still walking with a crutch and I have encouraged him to try and walk without as best as possible. [de-identified] : Continue physical therapy.  Follow-up in 6 weeks to 2 months.  He is already going to be seeing his rheumatologist who can start him on some new medication.\par \par If imaging was ordered, the patient was told to make an appointment to review findings right after all imaging is completed.\par \par We discussed risks, benefits and alternatives. Rationale of care was reviewed and all questions were answered. Patient (and family) had all questions answered to her degree of the level of satisfaction. Patient (and family) expressed understanding and interest in proceeding with the plan as outlined.\par \par \par \par \par This note was done with a voice recognition transcription software and any typos are related to this rather than medical error. Surgical risks reviewed. Patient (and family) had all questions answered to an agreeable level of satisfaction. Patient (and family) expressed understanding and interest in proceeding with the plan as outlined.  \par

## 2020-10-30 ENCOUNTER — APPOINTMENT (OUTPATIENT)
Dept: ORTHOPEDIC SURGERY | Facility: CLINIC | Age: 45
End: 2020-10-30
Payer: MEDICAID

## 2020-10-30 DIAGNOSIS — M25.661 STIFFNESS OF RIGHT KNEE, NOT ELSEWHERE CLASSIFIED: ICD-10-CM

## 2020-10-30 PROCEDURE — 99213 OFFICE O/P EST LOW 20 MIN: CPT

## 2020-10-30 PROCEDURE — 99072 ADDL SUPL MATRL&STAF TM PHE: CPT

## 2020-10-31 NOTE — DISCUSSION/SUMMARY
[All Questions Answered] : Patient (and family) had all questions answered to an agreeable level of satisfaction [Interested in Proceeding] : Patient (and family) expressed understanding and interest in proceeding with the plan as outlined [de-identified] : I discussed with the patient that I do not think that manipulation under anesthesia.  Compared to his other side he is about the same.  I doubt that he is going to get much better motion and he has right now unless his rheumatologist medicine starts working better.  He does have some issues with this that he is addressing with his rheumatologist.  I written him for more physical therapy.  I will see him back again in approximately 3 months.\par \par If imaging was ordered, the patient was told to make an appointment to review findings right after all imaging is completed.\par \par We discussed risks, benefits and alternatives. Rationale of care was reviewed and all questions were answered. Patient (and family) had all questions answered to her degree of the level of satisfaction. Patient (and family) expressed understanding and interest in proceeding with the plan as outlined.\par \par \par \par \par This note was done with a voice recognition transcription software and any typos are related to this rather than medical error. Surgical risks reviewed. Patient (and family) had all questions answered to an agreeable level of satisfaction. Patient (and family) expressed understanding and interest in proceeding with the plan as outlined.  \par

## 2020-10-31 NOTE — PHYSICAL EXAM
[General Appearance - Well-Appearing] : Well appearing [General Appearance - Well Nourished] : well nourished [FreeTextEntry1] : Anxious about medical condition [Sclera] : the sclera and conjunctiva were normal [Antalgic Gait Right] : antalgic on the right [Antalgic Gait Left] : antalgic on the left [Tenderness] : tenderness [Swelling] : no swelling [Erythema] : no erythema [Skin Changes - Describe changes:] : No skin changes noted [Incision Healed - Describe:] : Incision is healed ~M [Full ROM Unless otherwise noted:] : Full range of motion unless otherwise noted: [LE  Motor Strength Normal unless otherwise noted:] : 5/5 strength in bilateral lower extemities unless otherwise noted. [Normal] : Sensation intact to light touch. [2+] : left 2+

## 2020-10-31 NOTE — HISTORY OF PRESENT ILLNESS
[FreeTextEntry1] : Patient is back today to discuss both knees.  The right knee is still stiff and he gets from 3 degrees to 100 degrees.  He said that his physical therapist mentions him.  Manipulation under anesthesia.  I discussed with patient probably not the best idea because of his multiple other problems.  He has recently started Enbrel and has some complaints about this as well and he is discussing this with his rheumatologist.  He finds that his knee is stiff.  He is walking better than before and does not use an assistive device however does get stiff and pain often. [Improving] : improving [___ mths] : [unfilled] month(s) ago [3] : currently ~his/her~ pain is 3 out of 10 [Bending] : worsened by bending [Direct Pressure] : worsened by direct pressure [Joint Movement] : worsened by joint movement [None] : No relieving factors are noted

## 2020-10-31 NOTE — REVIEW OF SYSTEMS
[Feeling Tired] : feeling tired [Joint Stiffness] : joint stiffness [Feeling Weak] : feeling week [Nl] : Hematologic/Lymphatic

## 2020-11-10 ENCOUNTER — APPOINTMENT (OUTPATIENT)
Dept: RHEUMATOLOGY | Facility: CLINIC | Age: 45
End: 2020-11-10
Payer: MEDICAID

## 2020-11-10 VITALS
HEART RATE: 73 BPM | WEIGHT: 215 LBS | SYSTOLIC BLOOD PRESSURE: 113 MMHG | BODY MASS INDEX: 34.55 KG/M2 | TEMPERATURE: 98.8 F | DIASTOLIC BLOOD PRESSURE: 71 MMHG | HEIGHT: 66 IN | OXYGEN SATURATION: 99 %

## 2020-11-10 PROCEDURE — 99072 ADDL SUPL MATRL&STAF TM PHE: CPT

## 2020-11-10 PROCEDURE — 36415 COLL VENOUS BLD VENIPUNCTURE: CPT

## 2020-11-10 PROCEDURE — 99214 OFFICE O/P EST MOD 30 MIN: CPT | Mod: 25

## 2020-11-12 LAB
ALBUMIN SERPL ELPH-MCNC: 4.6 G/DL
ALP BLD-CCNC: 85 U/L
ALT SERPL-CCNC: 23 U/L
ANION GAP SERPL CALC-SCNC: 17 MMOL/L
AST SERPL-CCNC: 22 U/L
BASOPHILS # BLD AUTO: 0.03 K/UL
BASOPHILS NFR BLD AUTO: 0.4 %
BILIRUB SERPL-MCNC: 0.6 MG/DL
BUN SERPL-MCNC: 13 MG/DL
CALCIUM SERPL-MCNC: 9.5 MG/DL
CHLORIDE SERPL-SCNC: 104 MMOL/L
CO2 SERPL-SCNC: 18 MMOL/L
CREAT SERPL-MCNC: 0.96 MG/DL
CRP SERPL-MCNC: 0.37 MG/DL
EOSINOPHIL # BLD AUTO: 0.07 K/UL
EOSINOPHIL NFR BLD AUTO: 0.9 %
ERYTHROCYTE [SEDIMENTATION RATE] IN BLOOD BY WESTERGREN METHOD: 38 MM/HR
GLUCOSE SERPL-MCNC: 98 MG/DL
HCT VFR BLD CALC: 48.6 %
HGB BLD-MCNC: 15.4 G/DL
IMM GRANULOCYTES NFR BLD AUTO: 0.4 %
LYMPHOCYTES # BLD AUTO: 3.03 K/UL
LYMPHOCYTES NFR BLD AUTO: 39.3 %
MAN DIFF?: NORMAL
MCHC RBC-ENTMCNC: 29.8 PG
MCHC RBC-ENTMCNC: 31.7 GM/DL
MCV RBC AUTO: 94.2 FL
MONOCYTES # BLD AUTO: 0.44 K/UL
MONOCYTES NFR BLD AUTO: 5.7 %
NEUTROPHILS # BLD AUTO: 4.11 K/UL
NEUTROPHILS NFR BLD AUTO: 53.3 %
PLATELET # BLD AUTO: 304 K/UL
POTASSIUM SERPL-SCNC: 4.1 MMOL/L
PROT SERPL-MCNC: 8.1 G/DL
RBC # BLD: 5.16 M/UL
RBC # FLD: 15.1 %
SODIUM SERPL-SCNC: 139 MMOL/L
WBC # FLD AUTO: 7.71 K/UL

## 2020-11-25 NOTE — ASSESSMENT
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 Goldberg whether we should stop this now since Enbrel can increase the incidence of flares of anterior uveitis in patients with seronegative arthritis. \par Will refer to Dr. Naomi Goldberg also for formal evaluation. Hold Enbrel until next appt. \par Continue use of shampoo for psoriasis on scalp - though today there were no visible plaques on my exam\par Continue PT\par Check labs \par \par \par

## 2020-11-25 NOTE — HISTORY OF PRESENT ILLNESS
[Arthralgias] : arthralgias [Joint Swelling] : joint swelling [Morning Stiffness] : morning stiffness [FreeTextEntry1] : Office Visit 9/8/20:\par Presents today to start teaching for Enbrel \par KNee healing well. D/w Dr. 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 Thomas \par Start Enbrel once knee is evaluated to rule out infection. Consider medrol dosepack also. \par Check hepatitis serologies and quantiferon today \par \par Office Visit 7/9/20:\par Feels better on Prednisone - Skips some days though and then notes his pain is worse.\par Awaiting MRI L knee per Dr. 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 Reported association w/ PsA\par Feels about 50% better - still pain in Achilles tendon \par Plan: Doing well overall with improvement on Prednisone. His lipoma arborescens is likely the cause of his knee pain and swelling, and ongoing Achilles tendon pain 2/2 PsA. \par Still unclear significance of elevated CK, will continue to trend. \par Await contralateral MRI of knee - following surgical remove of lipoma arborescens will consider therapy for PsA since this is the likely cause - Otezla will be the best option given predominantly peripheral joint involvement. \par Prednisone 10mg daily \par No NSAIDs while on steroids - voltaren gel ordered\par Labs today\par \par Initial Visit:\par He has been having knee pain for many years - began with "wear and tear" of cartilage and then developed inflammation of both knees, had TKA approx. 4yrs ago in. L knee.\par Saw many orthopedists for evaluation of continued knee pain/swelling after surgery. Bilateral arthrocentesis by Dr. Fairchild 5/13 revealed CPPD crystals. He denied any pain relief from these, but states that the R knee felt better after having a depomedrol injection. Swelling recurred a few days later. \par Does not take anything for pain, rarely OTCs - ibuprofen/naprosyn will help. \par Used to take them more, but told by PCP that "one kidney is getting smaller" so he stopped this. \par +Stiffness in am\par +Swelling in knees only \par Had hyaluronic acid injections in knees approx 6mo ago but only had 2/3 injections  \par Has pain in achilles and under MTPs also. \par Sometimes base of CMC w/ pain \par + Psoriasis - lower back, scalp, \par No LBP \par No GI symptoms\par Plan: History of psoriasis also. \par Synovial fluid from knee aspirate reveals CPPD crystals in both knees, though they are extracellular - therefore there is no current inflammatory reaction to explain the pain. \par His achilles enthesitis, bilateral MTP pain/tenderness make a diagnosis of psoriatic arthritis likely. \par Check XRs, serologies and inflammatory markers today. \par DIscussed potential need for DMARD / biology therapy.  [Anorexia] : no anorexia [Weight Loss] : no weight loss [Malaise] : no malaise [Fever] : no fever [Chills] : no chills [Fatigue] : no fatigue [Depression] : no depression [Malar Facial Rash] : no malar facial rash [Skin Lesions] : no lesions [Skin Nodules] : no skin nodules [Oral Ulcers] : no oral ulcers [Cough] : no cough [Dry Mouth] : no dry mouth [Dysphagia] : no dysphagia [Shortness of Breath] : no shortness of breath [Chest Pain] : no chest pain [Joint Warmth] : no joint warmth [Joint Deformity] : no joint deformity [Decreased ROM] : no decreased range of motion [Falls] : no falls [Difficulty Standing] : no difficulty standing [Difficulty Walking] : no difficulty walking [Myalgias] : no myalgias [Muscle Weakness] : no muscle weakness [Muscle Spasms] : no muscle spasms [Muscle Cramping] : no muscle cramping [Visual Changes] : no visual changes [Eye Pain] : no eye pain [Eye Redness] : no eye redness [Dry Eyes] : no dry eyes

## 2020-12-01 ENCOUNTER — NON-APPOINTMENT (OUTPATIENT)
Age: 45
End: 2020-12-01

## 2020-12-01 ENCOUNTER — APPOINTMENT (OUTPATIENT)
Dept: OPHTHALMOLOGY | Facility: CLINIC | Age: 45
End: 2020-12-01
Payer: MEDICAID

## 2020-12-01 PROCEDURE — 99072 ADDL SUPL MATRL&STAF TM PHE: CPT

## 2020-12-01 PROCEDURE — 92004 COMPRE OPH EXAM NEW PT 1/>: CPT

## 2020-12-01 PROCEDURE — 92134 CPTRZ OPH DX IMG PST SGM RTA: CPT

## 2020-12-04 ENCOUNTER — APPOINTMENT (OUTPATIENT)
Dept: ORTHOPEDIC SURGERY | Facility: CLINIC | Age: 45
End: 2020-12-04

## 2020-12-22 ENCOUNTER — RX RENEWAL (OUTPATIENT)
Age: 45
End: 2020-12-22

## 2021-03-04 ENCOUNTER — APPOINTMENT (OUTPATIENT)
Dept: RHEUMATOLOGY | Facility: CLINIC | Age: 46
End: 2021-03-04
Payer: MEDICAID

## 2021-03-04 VITALS
BODY MASS INDEX: 35.52 KG/M2 | OXYGEN SATURATION: 97 % | TEMPERATURE: 97.5 F | HEIGHT: 66 IN | SYSTOLIC BLOOD PRESSURE: 103 MMHG | HEART RATE: 95 BPM | WEIGHT: 221 LBS | DIASTOLIC BLOOD PRESSURE: 69 MMHG

## 2021-03-04 PROCEDURE — 99214 OFFICE O/P EST MOD 30 MIN: CPT

## 2021-03-04 PROCEDURE — 99072 ADDL SUPL MATRL&STAF TM PHE: CPT

## 2021-03-05 ENCOUNTER — APPOINTMENT (OUTPATIENT)
Dept: ORTHOPEDIC SURGERY | Facility: CLINIC | Age: 46
End: 2021-03-05
Payer: MEDICAID

## 2021-03-05 DIAGNOSIS — M17.0 BILATERAL PRIMARY OSTEOARTHRITIS OF KNEE: ICD-10-CM

## 2021-03-05 DIAGNOSIS — M65.9 SYNOVITIS AND TENOSYNOVITIS, UNSPECIFIED: ICD-10-CM

## 2021-03-05 PROCEDURE — 99072 ADDL SUPL MATRL&STAF TM PHE: CPT

## 2021-03-05 PROCEDURE — 99213 OFFICE O/P EST LOW 20 MIN: CPT

## 2021-03-05 NOTE — HISTORY OF PRESENT ILLNESS
[FreeTextEntry1] : Patient is doing very well and is in significantly less pain than prior.  He is on the Enbrel and is tolerating this well.  He is seeing a new rheumatologist. [Stable] : stable [1] : currently ~his/her~ pain is 1 out of 10

## 2021-03-05 NOTE — PHYSICAL EXAM
[General Appearance - Alert] : alert [General Appearance - In No Acute Distress] : in no acute distress [General Appearance - Well-Appearing] : healthy appearing [Sclera] : the sclera and conjunctiva were normal [] : no respiratory distress [Respiration, Rhythm And Depth] : normal respiratory rhythm and effort [Exaggerated Use Of Accessory Muscles For Inspiration] : no accessory muscle use [Abnormal Walk] : normal gait [Oriented To Time, Place, And Person] : oriented to person, place, and time [Impaired Insight] : insight and judgment were intact [Affect] : the affect was normal [Mood] : the mood was normal [FreeTextEntry1] : +psoriasis

## 2021-03-05 NOTE — HISTORY OF PRESENT ILLNESS
[FreeTextEntry1] : Office Visit 9/8/20:\par Presents today to start teaching for Enbrel \par KNee healing well. D/w Dr. 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 Thomas \par Start Enbrel once knee is evaluated to rule out infection. Consider medrol dosepack also. \par Check hepatitis serologies and quantiferon today \par \par Office Visit 7/9/20:\par Feels better on Prednisone - Skips some days though and then notes his pain is worse.\par Awaiting MRI L knee per Dr. 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 Reported association w/ PsA\par Feels about 50% better - still pain in Achilles tendon \par Plan: Doing well overall with improvement on Prednisone. His lipoma arborescens is likely the cause of his knee pain and swelling, and ongoing Achilles tendon pain 2/2 PsA. \par Still unclear significance of elevated CK, will continue to trend. \par Await contralateral MRI of knee - following surgical remove of lipoma arborescens will consider therapy for PsA since this is the likely cause - Otezla will be the best option given predominantly peripheral joint involvement. \par Prednisone 10mg daily \par No NSAIDs while on steroids - voltaren gel ordered\par Labs today\par \par Initial Visit:\par He has been having knee pain for many years - began with "wear and tear" of cartilage and then developed inflammation of both knees, had TKA approx. 4yrs ago in. L knee.\par Saw many orthopedists for evaluation of continued knee pain/swelling after surgery. Bilateral arthrocentesis by Dr. Fairchild 5/13 revealed CPPD crystals. He denied any pain relief from these, but states that the R knee felt better after having a depomedrol injection. Swelling recurred a few days later. \par Does not take anything for pain, rarely OTCs - ibuprofen/naprosyn will help. \par Used to take them more, but told by PCP that "one kidney is getting smaller" so he stopped this. \par +Stiffness in am\par +Swelling in knees only \par Had hyaluronic acid injections in knees approx 6mo ago but only had 2/3 injections  \par Has pain in achilles and under MTPs also. \par Sometimes base of CMC w/ pain \par + Psoriasis - lower back, scalp, \par No LBP \par No GI symptoms\par Plan: History of psoriasis also. \par Synovial fluid from knee aspirate reveals CPPD crystals in both knees, though they are extracellular - therefore there is no current inflammatory reaction to explain the pain. \par His achilles enthesitis, bilateral MTP pain/tenderness make a diagnosis of psoriatic arthritis likely. \par Check XRs, serologies and inflammatory markers today. \par DIscussed potential need for DMARD / biology therapy.

## 2021-03-05 NOTE — ASSESSMENT
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  Patient is currently treated with enbrel for psoriatic arthritis  and psoriasis.  Was recently evaluated by dermatologist and recommended to adjust regimen, reviewed alternative regimens with patient, will start humira when returns from trip to [facility].  Will continue enbrel for now. Patient received first dose of covid vaccine, second dose pending, works as .  Reviewed side effects of humira with patient.  Reviewed hand washing, avoiding sick contacts, social distancing, facial coverings, and holding immunosuppressants if feeling unwell.

## 2021-03-05 NOTE — PHYSICAL EXAM
[FreeTextEntry1] : On exam the patient stands in good balance.  He is walking without any assistive device.  He is significantly better with less pain.  His right knee has no swelling.  Range of motion of 0 to 100 degrees.  Left knee has some mild swelling with range of motion of 0-115 degrees.  His other joints are not bothering him.  Is otherwise neurovascularly intact. [General Appearance - Well-Appearing] : Well appearing [General Appearance - Well Nourished] : well nourished [Antalgic Gait Right] : not antalgic on the right [Antalgic Gait Left] : not antalgic on the left [Tenderness] : no tenderness [Swelling] : no swelling [Erythema] : no erythema [Skin Changes - Describe changes:] : No skin changes noted [Incision Healed - Describe:] : Incision is healed ~M [Full ROM Unless otherwise noted:] : Full range of motion unless otherwise noted: [LE  Motor Strength Normal unless otherwise noted:] : 5/5 strength in bilateral lower extemities unless otherwise noted. [Normal] : Sensation intact to light touch. [2+] : left 2+

## 2021-03-05 NOTE — REVIEW OF SYSTEMS
[Joint Pain] : joint pain [Negative] : Heme/Lymph [FreeTextEntry9] : knees intermittently [de-identified] : psoriasis

## 2021-03-05 NOTE — DISCUSSION/SUMMARY
[All Questions Answered] : Patient (and family) had all questions answered to an agreeable level of satisfaction [Interested in Proceeding] : Patient (and family) expressed understanding and interest in proceeding with the plan as outlined [de-identified] : Overall patient has recovered significantly.  He still needs more physical therapy because of his lack of range of motion.  He is stiff in the right more than left knee and this may take a while to come back.  I recommended more physical therapy and written for his.  I want to see him again in 4 months time.  At that point we will get imaging as well for both total knee arthroplasties for follow-up.\par \par If imaging was ordered, the patient was told to make an appointment to review findings right after all imaging is completed.\par \par We discussed risks, benefits and alternatives. Rationale of care was reviewed and all questions were answered. Patient (and family) had all questions answered to her degree of the level of satisfaction. Patient (and family) expressed understanding and interest in proceeding with the plan as outlined.\par \par \par \par \par This note was done with a voice recognition transcription software and any typos are related to this rather than medical error. Surgical risks reviewed. Patient (and family) had all questions answered to an agreeable level of satisfaction. Patient (and family) expressed understanding and interest in proceeding with the plan as outlined.  \par

## 2021-03-05 NOTE — DATA REVIEWED
[FreeTextEntry1] : Surgical Final Report\par \par Final Diagnosis\par \par Right knee, synovectomy:\par - Synovium with marked reactive and degenerative changes,\par spotty hemosiderin deposition, lymphoid aggregates, and lobulated\par partially infarcted adipose tissue with focal chronic active\par inflammation.\par - GMS stain, negative for fungus (1A).\par \par Verified by: Flora Puente M.D.\par (Electronic Signature)\par Reported on: 08/03/20 11:36 EDT, [facility], [address] [place] [address]\par Phone: [phone]   Fax: [phone]\par _________________________________________________________________\par \par Clinical History\par Lipoma arborescence of right knee\par \par Specimen(s) Submitted\par 1     Right knee synovectomy\par \par Gross Description\par The specimen is received in formalin, labeled with the patient's\par identification and "right knee synovectomy."  It consists of one\par irregular piece and multiple fragments of brown-tan to yellow-tan\par and ragged to lobulated soft tissue measuring 11.7 x 5.9 x 2.4 cm\par in aggregate.  Sectioning reveals two yellow-tan, chalky foci\par measuring 0.6 and 0.4 cm in greatest dimension.  The remaining\par cut surfaces are yellow-tan to gray-pink, hemorrhagic, and\par lobulated to rubbery.  Representative sections are submitted in\par three cassettes, 1A-1C (1A-larger chalky focus; 1B-smaller chalky\par focus).\par Sandra Blum MS, PA([facility]) 07/28/2020 06:18\par EXAM: MR LWR EXT NON JOINT LT \par \par PROCEDURE DATE: 06/23/2020 \par \par \par \par INTERPRETATION: CLINICAL INDICATION: 45-year-old with weakness. \par \par \par \par \par FINDINGS: MRI of the left knee was performed in the axial, coronal and \par sagittal planes with proton density and fluid sensitive weighting with and \par without fat suppression. Reference is made to prior radiograph dated \par 2/24/2015. \par \par Evaluation of the knee demonstrates mucoid degeneration of the ACL, which is \par intact. The PCL, patellofemoral extensor mechanism and medial collateral \par ligament are normal in appearance. Evaluation of the lateral collateral \par ligament is limited secondary to artifact from hemiarthroplasty of the \par lateral compartment; within these limitations, the rectus femoris and \par fibular collateral ligaments are intact. Very large joint effusion with \par extensive intra-articular synovitis and fatty proliferation. Evaluation of \par the cartilage demonstrates fissuring of the lateral trochlea (image 13, \par axial). Surface irregularity and partial-thickness loss of the weightbearing \par medial femoral condyle; opposing medial tibial plateau is intact. Radial \par tearing of the medial meniscus. Postsurgical changes within Hoffa's fat. 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 \par \par Hemiarthroplasty of the lateral compartment. \par \par Labs quantiferon neg 8/2020\par Hep B neg\par

## 2021-04-21 RX ORDER — ETANERCEPT 50 MG/ML
50 SOLUTION SUBCUTANEOUS
Qty: 4 | Refills: 3 | Status: DISCONTINUED | COMMUNITY
Start: 2020-08-20 | End: 2021-04-21

## 2021-04-27 ENCOUNTER — APPOINTMENT (OUTPATIENT)
Dept: RHEUMATOLOGY | Facility: CLINIC | Age: 46
End: 2021-04-27
Payer: MEDICAID

## 2021-04-27 PROCEDURE — 99211 OFF/OP EST MAY X REQ PHY/QHP: CPT

## 2021-04-27 PROCEDURE — 99072 ADDL SUPL MATRL&STAF TM PHE: CPT

## 2021-05-13 ENCOUNTER — RX RENEWAL (OUTPATIENT)
Age: 46
End: 2021-05-13

## 2021-06-04 ENCOUNTER — APPOINTMENT (OUTPATIENT)
Dept: RHEUMATOLOGY | Facility: CLINIC | Age: 46
End: 2021-06-04

## 2021-06-11 ENCOUNTER — APPOINTMENT (OUTPATIENT)
Dept: RHEUMATOLOGY | Facility: CLINIC | Age: 46
End: 2021-06-11
Payer: MEDICAID

## 2021-06-11 VITALS
SYSTOLIC BLOOD PRESSURE: 112 MMHG | BODY MASS INDEX: 34.79 KG/M2 | WEIGHT: 216.5 LBS | TEMPERATURE: 97.8 F | HEART RATE: 67 BPM | HEIGHT: 66 IN | DIASTOLIC BLOOD PRESSURE: 67 MMHG | OXYGEN SATURATION: 98 %

## 2021-06-11 PROCEDURE — 99213 OFFICE O/P EST LOW 20 MIN: CPT | Mod: 25

## 2021-06-11 RX ORDER — TRAMADOL HYDROCHLORIDE 50 MG/1
50 TABLET, COATED ORAL
Qty: 60 | Refills: 0 | Status: DISCONTINUED | COMMUNITY
Start: 2020-08-13 | End: 2021-06-11

## 2021-06-11 RX ORDER — OXYCODONE AND ACETAMINOPHEN 5; 325 MG/1; MG/1
5-325 TABLET ORAL
Qty: 30 | Refills: 0 | Status: DISCONTINUED | COMMUNITY
Start: 2020-07-25 | End: 2021-06-11

## 2021-06-11 RX ORDER — IBUPROFEN 600 MG/1
600 TABLET, FILM COATED ORAL TWICE DAILY
Qty: 60 | Refills: 0 | Status: DISCONTINUED | COMMUNITY
Start: 2020-08-24 | End: 2021-06-11

## 2021-06-11 RX ORDER — PREDNISONE 5 MG/1
5 TABLET ORAL DAILY
Qty: 90 | Refills: 10 | Status: DISCONTINUED | COMMUNITY
Start: 2020-05-21 | End: 2021-06-11

## 2021-06-11 RX ORDER — DICLOFENAC SODIUM 10 MG/G
1 GEL TOPICAL DAILY
Qty: 1 | Refills: 2 | Status: DISCONTINUED | COMMUNITY
Start: 2020-07-09 | End: 2021-06-11

## 2021-06-11 RX ORDER — DIPHENHYDRAMINE HCL 25 MG/1
25 TABLET ORAL
Qty: 15 | Refills: 0 | Status: DISCONTINUED | COMMUNITY
Start: 2020-08-07 | End: 2021-06-11

## 2021-06-11 RX ORDER — TRAMADOL HYDROCHLORIDE 50 MG/1
50 TABLET, COATED ORAL
Qty: 60 | Refills: 0 | Status: DISCONTINUED | COMMUNITY
Start: 2020-08-07 | End: 2021-06-11

## 2021-06-11 RX ORDER — OXYCODONE 5 MG/1
5 TABLET ORAL
Qty: 30 | Refills: 0 | Status: DISCONTINUED | COMMUNITY
Start: 2020-07-24 | End: 2021-06-11

## 2021-06-11 NOTE — PHYSICAL EXAM
[General Appearance - In No Acute Distress] : in no acute distress [General Appearance - Alert] : alert [General Appearance - Well-Appearing] : healthy appearing [Sclera] : the sclera and conjunctiva were normal [] : no respiratory distress [Respiration, Rhythm And Depth] : normal respiratory rhythm and effort [Exaggerated Use Of Accessory Muscles For Inspiration] : no accessory muscle use [Abnormal Walk] : normal gait [Musculoskeletal - Swelling] : no joint swelling seen [Oriented To Time, Place, And Person] : oriented to person, place, and time [Affect] : the affect was normal [Mood] : the mood was normal [FreeTextEntry1] : minimal psoriasis behind the ear, more predominant on the scalp

## 2021-06-11 NOTE — DATA REVIEWED
[FreeTextEntry1] : Surgical Final Report\par \par Final Diagnosis\par \par Right knee, synovectomy:\par - Synovium with marked reactive and degenerative changes,\par spotty hemosiderin deposition, lymphoid aggregates, and lobulated\par partially infarcted adipose tissue with focal chronic active\par inflammation.\par - GMS stain, negative for fungus (1A).\par \par Verified by: Flora Puente M.D.\par (Electronic Signature)\par Reported on: 08/03/20 11:36 EDT, [facility], [address] [place] [address]\par Phone: [phone]   Fax: [phone]\par _________________________________________________________________\par \par Clinical History\par Lipoma arborescence of right knee\par \par Specimen(s) Submitted\par 1     Right knee synovectomy\par \par Gross Description\par The specimen is received in formalin, labeled with the patient's\par identification and "right knee synovectomy."  It consists of one\par irregular piece and multiple fragments of brown-tan to yellow-tan\par and ragged to lobulated soft tissue measuring 11.7 x 5.9 x 2.4 cm\par in aggregate.  Sectioning reveals two yellow-tan, chalky foci\par measuring 0.6 and 0.4 cm in greatest dimension.  The remaining\par cut surfaces are yellow-tan to gray-pink, hemorrhagic, and\par lobulated to rubbery.  Representative sections are submitted in\par three cassettes, 1A-1C (1A-larger chalky focus; 1B-smaller chalky\par focus).\par Sandra Blum MS, PA([facility]) 07/28/2020 06:18\par EXAM: MR LWR EXT NON JOINT LT \par \par PROCEDURE DATE: 06/23/2020 \par \par \par \par INTERPRETATION: CLINICAL INDICATION: 45-year-old with weakness. \par \par \par \par \par FINDINGS: MRI of the left knee was performed in the axial, coronal and \par sagittal planes with proton density and fluid sensitive weighting with and \par without fat suppression. Reference is made to prior radiograph dated \par 2/24/2015. \par \par Evaluation of the knee demonstrates mucoid degeneration of the ACL, which is \par intact. The PCL, patellofemoral extensor mechanism and medial collateral \par ligament are normal in appearance. Evaluation of the lateral collateral \par ligament is limited secondary to artifact from hemiarthroplasty of the \par lateral compartment; within these limitations, the rectus femoris and \par fibular collateral ligaments are intact. Very large joint effusion with \par extensive intra-articular synovitis and fatty proliferation. Evaluation of \par the cartilage demonstrates fissuring of the lateral trochlea (image 13, \par axial). Surface irregularity and partial-thickness loss of the weightbearing \par medial femoral condyle; opposing medial tibial plateau is intact. Radial \par tearing of the medial meniscus. Postsurgical changes within Hoffa's fat. 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 \par \par Hemiarthroplasty of the lateral compartment. \par \par Labs quantiferon neg 8/2020\par Hep B neg\par

## 2021-06-11 NOTE — HISTORY OF PRESENT ILLNESS
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 He feels this started after Enbrel. \par Other joints are feeling good\par Bilateral knee pain R>L, d/w Dr. Thomas at his last visit, who feels he is healing well \par Continues to work with PT\par \par March 4, 2021\par Patient returns for follow up\par Knee feeling better since surgery, has followup with orthopedist tomorrow.\par Was seen by dermatologist, requesting possible change in medication as enbrel helps joints, but still with psoriasis.  Psoriasis in ear, predominantly scalp and scattered throughout skin\par Dermatologist amy and Priscila\par Treats with cream for topical \par No side effects from medications\par No pain in the joints, intermittent right knee pain\par Will be traveling to [facility] following second vaccine dose.\par \par June 11, 2021\par Patient returns for follow up\par Patent feeling well, no joint pain at present.  Psoriasis improving but still persists on the scalp and behind the ears, although feels may have worsened with topical steroids.  \par No side effects from humira, completed five doses to date.

## 2021-06-11 NOTE — ASSESSMENT
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  Patient recently changed from  enbrel to humira (April 27, 2021) for psoriatic arthritis and psoriasis, with improvement.  would recommend follow up with dermatologist as feels kin may have worsened since starting topical steroids to rule out other etiology of dry skin in addition to psoriasis. Completed covid vaccine x 2 doses.  Will increase exercise as works as .  Reviewed side effects of humira with patient.  Reviewed hand washing, avoiding sick contacts, and holding immunosuppressants if feeling unwell.  Will update labs today in office.

## 2021-06-14 LAB
ALBUMIN SERPL ELPH-MCNC: 4.6 G/DL
ALP BLD-CCNC: 99 U/L
ALT SERPL-CCNC: 34 U/L
ANION GAP SERPL CALC-SCNC: 14 MMOL/L
AST SERPL-CCNC: 33 U/L
BASOPHILS # BLD AUTO: 0.04 K/UL
BASOPHILS NFR BLD AUTO: 0.4 %
BILIRUB SERPL-MCNC: 1.2 MG/DL
BUN SERPL-MCNC: 11 MG/DL
CALCIUM SERPL-MCNC: 9.8 MG/DL
CHLORIDE SERPL-SCNC: 100 MMOL/L
CO2 SERPL-SCNC: 25 MMOL/L
CREAT SERPL-MCNC: 1.06 MG/DL
CRP SERPL-MCNC: <3 MG/L
EOSINOPHIL # BLD AUTO: 0.11 K/UL
EOSINOPHIL NFR BLD AUTO: 1.2 %
ERYTHROCYTE [SEDIMENTATION RATE] IN BLOOD BY WESTERGREN METHOD: 16 MM/HR
GLUCOSE SERPL-MCNC: 101 MG/DL
HCT VFR BLD CALC: 47.1 %
HGB BLD-MCNC: 15.8 G/DL
IMM GRANULOCYTES NFR BLD AUTO: 0.4 %
LYMPHOCYTES # BLD AUTO: 3.64 K/UL
LYMPHOCYTES NFR BLD AUTO: 38.6 %
MAN DIFF?: NORMAL
MCHC RBC-ENTMCNC: 30.9 PG
MCHC RBC-ENTMCNC: 33.5 GM/DL
MCV RBC AUTO: 92.2 FL
MONOCYTES # BLD AUTO: 0.52 K/UL
MONOCYTES NFR BLD AUTO: 5.5 %
NEUTROPHILS # BLD AUTO: 5.08 K/UL
NEUTROPHILS NFR BLD AUTO: 53.9 %
PLATELET # BLD AUTO: 317 K/UL
POTASSIUM SERPL-SCNC: 4.3 MMOL/L
PROT SERPL-MCNC: 7.5 G/DL
RBC # BLD: 5.11 M/UL
RBC # FLD: 13.7 %
SODIUM SERPL-SCNC: 138 MMOL/L
WBC # FLD AUTO: 9.43 K/UL

## 2021-07-02 ENCOUNTER — OUTPATIENT (OUTPATIENT)
Dept: OUTPATIENT SERVICES | Facility: HOSPITAL | Age: 46
LOS: 1 days | End: 2021-07-02
Payer: MEDICAID

## 2021-07-02 ENCOUNTER — APPOINTMENT (OUTPATIENT)
Dept: ORTHOPEDIC SURGERY | Facility: CLINIC | Age: 46
End: 2021-07-02
Payer: MEDICAID

## 2021-07-02 ENCOUNTER — RESULT REVIEW (OUTPATIENT)
Age: 46
End: 2021-07-02

## 2021-07-02 PROCEDURE — 73562 X-RAY EXAM OF KNEE 3: CPT

## 2021-07-02 PROCEDURE — 73562 X-RAY EXAM OF KNEE 3: CPT | Mod: 26,LT,RT

## 2021-07-02 PROCEDURE — 99213 OFFICE O/P EST LOW 20 MIN: CPT

## 2021-07-02 NOTE — PHYSICAL EXAM
[FreeTextEntry1] : On exam both knees have healed incisions.  The range of motion on the right side is 0-100 on the left is 0-110.  He stable to varus valgus and anterior testing.  He has no obvious swelling on the right with minimal swelling on the left.  He has no areas of tenderness.  His quad strength is 4+ out of 5.  He is able to walk normally.  He is neurovascularly intact. [General Appearance - Well-Appearing] : Well appearing [General Appearance - Well Nourished] : well nourished [Oriented To Time, Place, And Person] : Oriented to person, place, and time [Impaired Insight] : Insight and judgment were intact [Affect] : The affect was normal. [Mood] : the mood was normal [Antalgic Gait Right] : not antalgic on the right [Antalgic Gait Left] : not antalgic on the left [Tenderness] : no tenderness [Swelling] : no swelling [Erythema] : no erythema [Skin Changes - Describe changes:] : No skin changes noted [Incision Healed - Describe:] : Incision is healed ~M [Full ROM Unless otherwise noted:] : Full range of motion unless otherwise noted: [LE  Motor Strength Normal unless otherwise noted:] : 5/5 strength in bilateral lower extemities unless otherwise noted. [Normal] : Sensation intact to light touch. [2+] : left 2+

## 2021-07-02 NOTE — HISTORY OF PRESENT ILLNESS
[FreeTextEntry1] : Patient has been working with her rheumatologist and currently started  Enbrel.  He is tolerating this well and is happy with his results.  Is significantly less knee pain.  He complains that he cannot get his muscle stronger however has not been doing much with therapy.  Psoriasis is improved but not completely gone. [Stable] : stable [1] : currently ~his/her~ pain is 1 out of 10 [Intermit.] : ~He/She~ states the symptoms seem to be intermittent [Bending] : worsened by bending [Direct Pressure] : worsened by direct pressure

## 2021-07-02 NOTE — DISCUSSION/SUMMARY
[All Questions Answered] : Patient (and family) had all questions answered to an agreeable level of satisfaction [Interested in Proceeding] : Patient (and family) expressed understanding and interest in proceeding with the plan as outlined [de-identified] : Patient is doing very well 1 year postop.  She does not seem to need any other further treatment right now.  I do not recommend any arthroplasty or any surgery for him.  He is getting continue with his medical treatment.  I want to see him again as needed.  He understands that this may come back and he may have degenerative changes in the future and may need further treatment.\par \par If imaging was ordered, the patient was told to make an appointment to review findings right after all imaging is completed.\par \par We discussed risks, benefits and alternatives. Rationale of care was reviewed and all questions were answered. Patient (and family) had all questions answered to her degree of the level of satisfaction. Patient (and family) expressed understanding and interest in proceeding with the plan as outlined.\par \par \par \par \par This note was done with a voice recognition transcription software and any typos are related to this rather than medical error. Surgical risks reviewed. Patient (and family) had all questions answered to an agreeable level of satisfaction. Patient (and family) expressed understanding and interest in proceeding with the plan as outlined.  \par

## 2021-07-23 ENCOUNTER — APPOINTMENT (OUTPATIENT)
Dept: RHEUMATOLOGY | Facility: CLINIC | Age: 46
End: 2021-07-23
Payer: MEDICAID

## 2021-07-23 ENCOUNTER — NON-APPOINTMENT (OUTPATIENT)
Age: 46
End: 2021-07-23

## 2021-07-23 VITALS
WEIGHT: 216 LBS | BODY MASS INDEX: 34.72 KG/M2 | HEIGHT: 66 IN | HEART RATE: 91 BPM | DIASTOLIC BLOOD PRESSURE: 59 MMHG | TEMPERATURE: 99.1 F | SYSTOLIC BLOOD PRESSURE: 103 MMHG | OXYGEN SATURATION: 98 %

## 2021-07-23 PROCEDURE — 99213 OFFICE O/P EST LOW 20 MIN: CPT

## 2021-07-23 NOTE — PHYSICAL EXAM
[General Appearance - Alert] : alert [General Appearance - In No Acute Distress] : in no acute distress [General Appearance - Well-Appearing] : healthy appearing [] : no respiratory distress [Respiration, Rhythm And Depth] : normal respiratory rhythm and effort [Exaggerated Use Of Accessory Muscles For Inspiration] : no accessory muscle use [Edema] : there was no peripheral edema [Abnormal Walk] : normal gait [Oriented To Time, Place, And Person] : oriented to person, place, and time [Impaired Insight] : insight and judgment were intact [Affect] : the affect was normal [Mood] : the mood was normal [FreeTextEntry1] : minimal psoriatic lesions diffusely on the extremities (improved)

## 2021-07-23 NOTE — HISTORY OF PRESENT ILLNESS
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 He feels this started after Enbrel. \par Other joints are feeling good\par Bilateral knee pain R>L, d/w Dr. Thomas at his last visit, who feels he is healing well \par Continues to work with PT\par \par March 4, 2021\par Patient returns for follow up\par Knee feeling better since surgery, has followup with orthopedist tomorrow.\par Was seen by dermatologist, requesting possible change in medication as enbrel helps joints, but still with psoriasis.  Psoriasis in ear, predominantly scalp and scattered throughout skin\par Dermatologist [facility] and [facility]\par Treats with cream for topical \par No side effects from medications\par No pain in the joints, intermittent right knee pain\par [facility].\par \par June 11, 2021\par Patient returns for follow up\par Patent feeling well, no joint pain at present.  Psoriasis improving but still persists on the scalp and behind the ears, although feels may have worsened with topical steroids.  \par No side effects from humira, completed five doses to date.\par \par July 23, 2021\par Patient returns for follow up \par Patient feeling well although had some left knee discomfort with swelling earlier in this week, now improved.  No NSAIDs, patient does not take additional medications for pain. \par Was seen by orthopedist in early July, stable, no Surgicel intervention at this time\par Reviewed labs with patient\par Patent also had follow up with dermatologist, had biopsy of scalp, no results to date.\par patient requests PT referral\par Reviewed labs with patient\par Reviewed recent xrays with patient.

## 2021-07-23 NOTE — ASSESSMENT
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  Patient recently changed from  enbrel to humira (April 27, 2021) for psoriatic arthritis and psoriasis, with improvement.  Had recent dermatology evaluation with scalp biopsy, will follow up for results and subsequent treatment.  Completed covid vaccine x 2 doses.  Will increase exercise as works as , patient requesting PT referral for the knees.  Discussed NSADs as needed for pain, patient has ibuprofen at home as needed.   Reviewed side effects of humira with patient.  Reviewed hand washing, avoiding sick contacts, and holding immunosuppressants if feeling unwell.  follow up in 3-4 months or sooner if symptoms change or worsen.

## 2021-07-23 NOTE — DATA REVIEWED
[FreeTextEntry1] : Surgical Final Report\par \par Final Diagnosis\par \par Right knee, synovectomy:\par - Synovium with marked reactive and degenerative changes,\par spotty hemosiderin deposition, lymphoid aggregates, and lobulated\par partially infarcted adipose tissue with focal chronic active\par inflammation.\par - GMS stain, negative for fungus (1A).\par \par Verified by: Flora Puente M.D.\par (Electronic Signature)\par Reported on: 08/03/20 11:36 EDT, [facility], [address] [place] [address]\par Phone: [phone]   Fax: [phone]\par _________________________________________________________________\par \par Clinical History\par Lipoma arborescence of right knee\par \par Specimen(s) Submitted\par 1     Right knee synovectomy\par \par Gross Description\par The specimen is received in formalin, labeled with the patient's\par identification and "right knee synovectomy."  It consists of one\par irregular piece and multiple fragments of brown-tan to yellow-tan\par and ragged to lobulated soft tissue measuring 11.7 x 5.9 x 2.4 cm\par in aggregate.  Sectioning reveals two yellow-tan, chalky foci\par measuring 0.6 and 0.4 cm in greatest dimension.  The remaining\par cut surfaces are yellow-tan to gray-pink, hemorrhagic, and\par lobulated to rubbery.  Representative sections are submitted in\par three cassettes, 1A-1C (1A-larger chalky focus; 1B-smaller chalky\par focus).\par Sandra Blum MS, PA([facility]) 07/28/2020 06:18\par EXAM: MR LWR EXT NON JOINT LT \par \par PROCEDURE DATE: 06/23/2020 \par \par \par \par INTERPRETATION: CLINICAL INDICATION: 45-year-old with weakness. \par \par \par \par \par FINDINGS: MRI of the left knee was performed in the axial, coronal and \par sagittal planes with proton density and fluid sensitive weighting with and \par without fat suppression. Reference is made to prior radiograph dated \par 2/24/2015. \par \par Evaluation of the knee demonstrates mucoid degeneration of the ACL, which is \par intact. The PCL, patellofemoral extensor mechanism and medial collateral \par ligament are normal in appearance. Evaluation of the lateral collateral \par ligament is limited secondary to artifact from hemiarthroplasty of the \par lateral compartment; within these limitations, the rectus femoris and \par fibular collateral ligaments are intact. Very large joint effusion with \par extensive intra-articular synovitis and fatty proliferation. Evaluation of \par the cartilage demonstrates fissuring of the lateral trochlea (image 13, \par axial). Surface irregularity and partial-thickness loss of the weightbearing \par medial femoral condyle; opposing medial tibial plateau is intact. Radial \par tearing of the medial meniscus. Postsurgical changes within Hoffa's fat. 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 \par \par Hemiarthroplasty of the lateral compartment. \par \par Labs quantiferon neg 8/2020\par Hep B neg\par

## 2021-08-02 ENCOUNTER — RX RENEWAL (OUTPATIENT)
Age: 46
End: 2021-08-02

## 2021-08-06 ENCOUNTER — APPOINTMENT (OUTPATIENT)
Dept: ORTHOPEDIC SURGERY | Facility: CLINIC | Age: 46
End: 2021-08-06
Payer: MEDICAID

## 2021-08-06 DIAGNOSIS — Z47.1 AFTERCARE FOLLOWING JOINT REPLACEMENT SURGERY: ICD-10-CM

## 2021-08-06 DIAGNOSIS — Z96.652 AFTERCARE FOLLOWING JOINT REPLACEMENT SURGERY: ICD-10-CM

## 2021-08-06 PROCEDURE — 20610 DRAIN/INJ JOINT/BURSA W/O US: CPT | Mod: LT

## 2021-08-06 PROCEDURE — 99213 OFFICE O/P EST LOW 20 MIN: CPT | Mod: 25

## 2021-08-06 NOTE — HISTORY OF PRESENT ILLNESS
[FreeTextEntry1] : In the past 2 weeks patient has started having some more pain in his left knee.  He was fine when I saw him before.  He recently changed rheumatologic drugs and finds that it is hurting more at this point.  He finds it with the humid weather especially he gets more pain.  He is here for evaluation of this and possible treatment. [Worsening] : worsening [___ wks] : [unfilled] week(s) ago [3] : currently ~his/her~ pain is 3 out of 10 [Bending] : worsened by bending [de-identified] : Standing

## 2021-08-06 NOTE — PROCEDURE
[Aspiration] : Aspiration [Left] : of the left [Knee Joint] : knee joint [Effusion] : Effusion [Joint Pain] : Joint pain [Bleeding] : bleeding [Patient] : patient [Risk] : risk [Benefits] : benefits [Alternatives] : alternatives [Verbal Consent Obtained] : verbal consent was obtained prior to the procedure [Alcohol] : Alcohol [Betadine] : Betadine [Ethyl Chloride Spray] : ethyl chloride spray was used as a topical anesthetic [Direct] : direct [1.5  inch] : 1.5 inch needle [___ mL Fluid] : [unfilled] mL of [Yellow] : yellow [Clear] : clear [1% Lidocaine___(mL)] : [unfilled] mL of 1% Lidocaine [Methylpred. 40mg/mL___(mL)] : [unfilled] mL of 40mg/mL methylprednisolone [Bandage Applied] : a bandage [Ace Wrap] : an ace wrap [Tolerated Well] : the patient tolerated the procedure well [None] : none [de-identified] : Synovitis [FreeTextEntry1] : Sterile prep even more than usual with presence of prosthesis [de-identified] : 16

## 2021-08-06 NOTE — DISCUSSION/SUMMARY
[All Questions Answered] : Patient (and family) had all questions answered to an agreeable level of satisfaction [Interested in Proceeding] : Patient (and family) expressed understanding and interest in proceeding with the plan as outlined [de-identified] : Patient is having some significant synovitis with effusion and pain.  This could be because you changed rheumatologic drugs or because of his usual problem.  My recommendation even though he has a prosthetic knee is to aspirate and inject.  We did this under strict sterile conditions.  The patient tolerated this and was feeling better afterwards.  He can follow-up with his rheumatologist to possibly change back to other medication or treat as per their team.  Follow-up as needed\par \par If imaging was ordered, the patient was told to make an appointment to review findings right after all imaging is completed.\par \par We discussed risks, benefits and alternatives. Rationale of care was reviewed and all questions were answered. Patient (and family) had all questions answered to her degree of the level of satisfaction. Patient (and family) expressed understanding and interest in proceeding with the plan as outlined.\par \par \par \par \par This note was done with a voice recognition transcription software and any typos are related to this rather than medical error. Surgical risks reviewed. Patient (and family) had all questions answered to an agreeable level of satisfaction. Patient (and family) expressed understanding and interest in proceeding with the plan as outlined.  \par

## 2021-08-06 NOTE — PHYSICAL EXAM
[FreeTextEntry1] : On exam patient stands in good balance.  He has bilateral knee swelling as it was with his synovitis.  The left knee is more swollen than the right.  Range of motion is 0 to 90 degrees.  His incision is clean dry and intact from his arthroplasty.  The right knee some mild swelling but no effusion the left knee has an obvious moderate effusion.  He stable to varus and valgus.  He is able to walk.  He has no erythema or warmth to suggest any sepsis.  He is neurovascularly intact. [General Appearance - Well-Appearing] : Well appearing [General Appearance - Well Nourished] : well nourished [Oriented To Time, Place, And Person] : Oriented to person, place, and time [Impaired Insight] : Insight and judgment were intact [Affect] : The affect was normal. [Mood] : the mood was normal [Sclera] : the sclera and conjunctiva were normal [Neck Cervical Mass (___cm)] : no neck mass was observed [Heart Rate And Rhythm] : heart rate was normal and rhythm regular [] : No respiratory distress [Abdomen Soft] : Soft [Normal Station and Gait] : gait and station were normal [Tenderness] : no tenderness [Swelling] : no swelling [Erythema] : no erythema [Skin Changes - Describe changes:] : No skin changes noted [Incision Healed - Describe:] : Incision is healed ~M [Full ROM Unless otherwise noted:] : Full range of motion unless otherwise noted: [LE  Motor Strength Normal unless otherwise noted:] : 5/5 strength in bilateral lower extemities unless otherwise noted. [Normal] : Sensation intact to light touch. [2+] : left 2+

## 2021-08-19 RX ORDER — ADALIMUMAB 40MG/0.4ML
40 KIT SUBCUTANEOUS
Qty: 1 | Refills: 2 | Status: DISCONTINUED | COMMUNITY
Start: 2021-03-05 | End: 2021-08-19

## 2021-11-23 ENCOUNTER — APPOINTMENT (OUTPATIENT)
Dept: RHEUMATOLOGY | Facility: CLINIC | Age: 46
End: 2021-11-23
Payer: MEDICAID

## 2021-11-23 VITALS
WEIGHT: 220 LBS | SYSTOLIC BLOOD PRESSURE: 118 MMHG | TEMPERATURE: 98.2 F | OXYGEN SATURATION: 97 % | HEIGHT: 66 IN | DIASTOLIC BLOOD PRESSURE: 69 MMHG | BODY MASS INDEX: 35.36 KG/M2 | HEART RATE: 127 BPM

## 2021-11-23 DIAGNOSIS — L65.9 NONSCARRING HAIR LOSS, UNSPECIFIED: ICD-10-CM

## 2021-11-23 PROCEDURE — 99214 OFFICE O/P EST MOD 30 MIN: CPT | Mod: 25

## 2021-11-23 RX ORDER — ETANERCEPT 50 MG/ML
50 SOLUTION SUBCUTANEOUS
Qty: 4 | Refills: 3 | Status: DISCONTINUED | COMMUNITY
Start: 2021-08-19 | End: 2021-11-23

## 2021-11-24 LAB
ALBUMIN SERPL ELPH-MCNC: 4.6 G/DL
ALP BLD-CCNC: 90 U/L
ALT SERPL-CCNC: 22 U/L
ANION GAP SERPL CALC-SCNC: 15 MMOL/L
AST SERPL-CCNC: 17 U/L
BASOPHILS # BLD AUTO: 0.04 K/UL
BASOPHILS NFR BLD AUTO: 0.4 %
BILIRUB SERPL-MCNC: 0.4 MG/DL
BUN SERPL-MCNC: 14 MG/DL
CALCIUM SERPL-MCNC: 9.4 MG/DL
CHLORIDE SERPL-SCNC: 104 MMOL/L
CO2 SERPL-SCNC: 20 MMOL/L
CREAT SERPL-MCNC: 1 MG/DL
CRP SERPL-MCNC: 4 MG/L
EOSINOPHIL # BLD AUTO: 0.08 K/UL
EOSINOPHIL NFR BLD AUTO: 0.9 %
ERYTHROCYTE [SEDIMENTATION RATE] IN BLOOD BY WESTERGREN METHOD: 36 MM/HR
GLUCOSE SERPL-MCNC: 94 MG/DL
HCT VFR BLD CALC: 47.8 %
HGB BLD-MCNC: 15.3 G/DL
IMM GRANULOCYTES NFR BLD AUTO: 0.4 %
LYMPHOCYTES # BLD AUTO: 3.67 K/UL
LYMPHOCYTES NFR BLD AUTO: 40.4 %
MAN DIFF?: NORMAL
MCHC RBC-ENTMCNC: 29.2 PG
MCHC RBC-ENTMCNC: 32 GM/DL
MCV RBC AUTO: 91.2 FL
MONOCYTES # BLD AUTO: 0.59 K/UL
MONOCYTES NFR BLD AUTO: 6.5 %
NEUTROPHILS # BLD AUTO: 4.67 K/UL
NEUTROPHILS NFR BLD AUTO: 51.4 %
PLATELET # BLD AUTO: 316 K/UL
POTASSIUM SERPL-SCNC: 4.2 MMOL/L
PROT SERPL-MCNC: 7.4 G/DL
RBC # BLD: 5.24 M/UL
RBC # FLD: 13.8 %
SODIUM SERPL-SCNC: 140 MMOL/L
WBC # FLD AUTO: 9.09 K/UL

## 2021-11-24 RX ORDER — ROSUVASTATIN CALCIUM 5 MG/1
5 TABLET, FILM COATED ORAL
Qty: 30 | Refills: 0 | Status: ACTIVE | COMMUNITY
Start: 2021-07-29

## 2021-11-24 RX ORDER — AMOXICILLIN 500 MG/1
500 CAPSULE ORAL
Qty: 4 | Refills: 0 | Status: COMPLETED | COMMUNITY
Start: 2021-11-11

## 2021-11-24 RX ORDER — CLOBETASOL PROPIONATE 0.5 MG/ML
0.05 SOLUTION TOPICAL
Qty: 50 | Refills: 0 | Status: ACTIVE | COMMUNITY
Start: 2021-06-24

## 2021-11-24 RX ORDER — AZELASTINE HYDROCHLORIDE 0.5 MG/ML
0.05 SOLUTION/ DROPS OPHTHALMIC
Qty: 6 | Refills: 0 | Status: COMPLETED | COMMUNITY
Start: 2021-07-29

## 2021-11-24 RX ORDER — KETOCONAZOLE 20.5 MG/ML
2 SHAMPOO, SUSPENSION TOPICAL
Qty: 120 | Refills: 0 | Status: ACTIVE | COMMUNITY
Start: 2021-07-29

## 2021-11-24 NOTE — ASSESSMENT
[FreeTextEntry1] : 46 year old male presents for follow up of psoriatic arthritis.  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  Patient recently changed from enbrel to humira (April 27, 2021) for psoriatic arthritis and psoriasis, with initial improvement, however, spoke with patient in August 2021 and requested to change back to enbrel with subsequent improvement in symptoms.  Patient would like to change to syringe form of enbrel. Will refer to dermatology for reevaluation as well, given alopecia, s/p previous scalp biopsy unclear results, will follow up for results and subsequent treatment.  Completed covid vaccine x 2 doses, will schedule booster dose. Discussed increasing exercise as works as .  Discussed NSAIDs as needed for pain, patient has ibuprofen at home as needed. Reviewed hand washing, avoiding sick contacts, and holding immunosuppressants if feeling unwell.  Will update labs today including quantiferon gold.  Follow up in 3-4 months or sooner if symptoms change or worsen.

## 2021-11-24 NOTE — HISTORY OF PRESENT ILLNESS
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 He feels this started after Enbrel. \par Other joints are feeling good\par Bilateral knee pain R>L, d/w Dr. Thomas at his last visit, who feels he is healing well \par Continues to work with PT\par \par March 4, 2021\par Patient returns for follow up\par Knee feeling better since surgery, has followup with orthopedist tomorrow.\par Was seen by dermatologist, requesting possible change in medication as enbrel helps joints, but still with psoriasis.  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  Psoriasis improving but still persists on the scalp and behind the ears, although feels may have worsened with topical steroids.  \par No side effects from humira, completed five doses to date.\par \par July 23, 2021\par Patient returns for follow up \par Patient feeling well although had some left knee discomfort with swelling earlier in this week, now improved.  No NSAIDs, patient does not take additional medications for pain. \par Was seen by orthopedist in early July, stable, no Surgicel intervention at this time\par Reviewed labs with patient\par Patent also had follow up with dermatologist, had biopsy of scalp, no results to date.\par patient requests PT referral\par Reviewed labs with patient\par Reviewed recent xrays with patient. \par \par November 23, 2021\par Patient returns for follow up\par Patient feeling well\par left knee with intermittent pain and swelling, not at present\par Trying to exercise more, but sits often as works as \par No recent infections\par Will arrange for booster\par Will be travelling to [facility] in January\par Patient would like to change back to Enbrel syringe as sometimes feels unsure if receives full dose with pen\par Patient concerned about alopecia as well

## 2021-11-24 NOTE — DATA REVIEWED
[FreeTextEntry1] : Surgical Final Report\par \par Final Diagnosis\par \par Right knee, synovectomy:\par - Synovium with marked reactive and degenerative changes,\par spotty hemosiderin deposition, lymphoid aggregates, and lobulated\par partially infarcted adipose tissue with focal chronic active\par inflammation.\par - GMS stain, negative for fungus (1A).\par \par Verified by: Flora Puente M.D.\par (Electronic Signature)\par Reported on: 08/03/20 11:36 EDT, [facility], [address] [place] [address]\par Phone: [phone]   Fax: [phone]\par _________________________________________________________________\par \par Clinical History\par Lipoma arborescence of right knee\par \par Specimen(s) Submitted\par 1     Right knee synovectomy\par \par Gross Description\par The specimen is received in formalin, labeled with the patient's\par identification and "right knee synovectomy."  It consists of one\par irregular piece and multiple fragments of brown-tan to yellow-tan\par and ragged to lobulated soft tissue measuring 11.7 x 5.9 x 2.4 cm\par in aggregate.  Sectioning reveals two yellow-tan, chalky foci\par measuring 0.6 and 0.4 cm in greatest dimension.  The remaining\par cut surfaces are yellow-tan to gray-pink, hemorrhagic, and\par lobulated to rubbery.  Representative sections are submitted in\par three cassettes, 1A-1C (1A-larger chalky focus; 1B-smaller chalky\par focus).\par Sandra Blum MS, PA([facility]) 07/28/2020 06:18\par EXAM: MR LWR EXT NON JOINT LT \par \par PROCEDURE DATE: 06/23/2020 \par \par \par \par INTERPRETATION: CLINICAL INDICATION: 45-year-old with weakness. \par \par \par \par \par FINDINGS: MRI of the left knee was performed in the axial, coronal and \par sagittal planes with proton density and fluid sensitive weighting with and \par without fat suppression. Reference is made to prior radiograph dated \par 2/24/2015. \par \par Evaluation of the knee demonstrates mucoid degeneration of the ACL, which is \par intact. The PCL, patellofemoral extensor mechanism and medial collateral \par ligament are normal in appearance. Evaluation of the lateral collateral \par ligament is limited secondary to artifact from hemiarthroplasty of the \par lateral compartment; within these limitations, the rectus femoris and \par fibular collateral ligaments are intact. Very large joint effusion with \par extensive intra-articular synovitis and fatty proliferation. Evaluation of \par the cartilage demonstrates fissuring of the lateral trochlea (image 13, \par axial). Surface irregularity and partial-thickness loss of the weightbearing \par medial femoral condyle; opposing medial tibial plateau is intact. Radial \par tearing of the medial meniscus. Postsurgical changes within Hoffa's fat. 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 \par \par Hemiarthroplasty of the lateral compartment. \par \par Labs quantiferon neg 8/2020\par Hep B neg\par

## 2021-11-24 NOTE — PHYSICAL EXAM
[General Appearance - Alert] : alert [General Appearance - In No Acute Distress] : in no acute distress [General Appearance - Well-Appearing] : healthy appearing [Sclera] : the sclera and conjunctiva were normal [] : no respiratory distress [Respiration, Rhythm And Depth] : normal respiratory rhythm and effort [Exaggerated Use Of Accessory Muscles For Inspiration] : no accessory muscle use [Edema] : there was no peripheral edema [Abnormal Walk] : normal gait [Nail Clubbing] : no clubbing  or cyanosis of the fingernails [Oriented To Time, Place, And Person] : oriented to person, place, and time [Impaired Insight] : insight and judgment were intact [Affect] : the affect was normal [FreeTextEntry1] : minimal left knee edema with good ROM, nontender

## 2021-11-28 LAB
M TB IFN-G BLD-IMP: NEGATIVE
QUANTIFERON TB PLUS MITOGEN MINUS NIL: 9.86 IU/ML
QUANTIFERON TB PLUS NIL: 0.07 IU/ML
QUANTIFERON TB PLUS TB1 MINUS NIL: -0.01 IU/ML
QUANTIFERON TB PLUS TB2 MINUS NIL: -0.02 IU/ML

## 2022-06-08 ENCOUNTER — RX RENEWAL (OUTPATIENT)
Age: 47
End: 2022-06-08

## 2022-07-01 ENCOUNTER — APPOINTMENT (OUTPATIENT)
Dept: RHEUMATOLOGY | Facility: CLINIC | Age: 47
End: 2022-07-01

## 2022-07-01 VITALS
OXYGEN SATURATION: 96 % | HEIGHT: 66 IN | BODY MASS INDEX: 35.84 KG/M2 | DIASTOLIC BLOOD PRESSURE: 69 MMHG | SYSTOLIC BLOOD PRESSURE: 115 MMHG | HEART RATE: 79 BPM | WEIGHT: 223 LBS | TEMPERATURE: 98.2 F

## 2022-07-01 PROCEDURE — 36415 COLL VENOUS BLD VENIPUNCTURE: CPT

## 2022-07-01 PROCEDURE — 99214 OFFICE O/P EST MOD 30 MIN: CPT | Mod: 25

## 2022-07-01 NOTE — PHYSICAL EXAM
[General Appearance - Alert] : alert [General Appearance - In No Acute Distress] : in no acute distress [General Appearance - Well Nourished] : well nourished [General Appearance - Well Developed] : well developed [General Appearance - Well-Appearing] : healthy appearing [Sclera] : the sclera and conjunctiva were normal [] : no respiratory distress [Respiration, Rhythm And Depth] : normal respiratory rhythm and effort [Exaggerated Use Of Accessory Muscles For Inspiration] : no accessory muscle use [Edema] : there was no peripheral edema [Abnormal Walk] : normal gait [Oriented To Time, Place, And Person] : oriented to person, place, and time [Impaired Insight] : insight and judgment were intact [Affect] : the affect was normal [Mood] : the mood was normal [FreeTextEntry1] : hyperpigmented area of scalp on the posterior scalp

## 2022-07-01 NOTE — ASSESSMENT
[FreeTextEntry1] : 47 year old male presents for follow up of psoriatic arthritis.  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  Patient previously changed from enbrel to humira (April 27, 2021) for psoriatic arthritis and psoriasis, with initial improvement, however, in August 2021 requested to change back to enbrel with subsequent improvement in symptoms.  Will refer to dermatology for reevaluation as well, given alopecia, s/p previous scalp biopsy unclear results, will follow up for results and subsequent treatment.  Completed covid vaccine x 2 doses, with booster. Discussed increasing exercise as works as .  Discussed NSAIDs as needed for pain, patient has ibuprofen at home as needed. Reviewed hand washing, avoiding sick contacts, and holding immunosuppressants if feeling unwell.  Will update labs today in office.  Follow up in 3-4 months or sooner if symptoms change or worsen.

## 2022-07-01 NOTE — DATA REVIEWED
[FreeTextEntry1] : Surgical Final Report\par \par Final Diagnosis\par \par Right knee, synovectomy:\par - Synovium with marked reactive and degenerative changes,\par spotty hemosiderin deposition, lymphoid aggregates, and lobulated\par partially infarcted adipose tissue with focal chronic active\par inflammation.\par - GMS stain, negative for fungus (1A).\par \par Verified by: Flora Puente M.D.\par (Electronic Signature)\par Reported on: 08/03/20 11:36 EDT, [facility], [address] [place] [address]\par Phone: [phone]   Fax: [phone]\par _________________________________________________________________\par \par Clinical History\par Lipoma arborescence of right knee\par \par Specimen(s) Submitted\par 1     Right knee synovectomy\par \par Gross Description\par The specimen is received in formalin, labeled with the patient's\par identification and "right knee synovectomy."  It consists of one\par irregular piece and multiple fragments of brown-tan to yellow-tan\par and ragged to lobulated soft tissue measuring 11.7 x 5.9 x 2.4 cm\par in aggregate.  Sectioning reveals two yellow-tan, chalky foci\par measuring 0.6 and 0.4 cm in greatest dimension.  The remaining\par cut surfaces are yellow-tan to gray-pink, hemorrhagic, and\par lobulated to rubbery.  Representative sections are submitted in\par three cassettes, 1A-1C (1A-larger chalky focus; 1B-smaller chalky\par focus).\par Sandra Blum MS, PA([facility]) 07/28/2020 06:18\par EXAM: MR LWR EXT NON JOINT LT \par \par PROCEDURE DATE: 06/23/2020 \par \par \par \par INTERPRETATION: CLINICAL INDICATION: 45-year-old with weakness. \par \par \par \par \par FINDINGS: MRI of the left knee was performed in the axial, coronal and \par sagittal planes with proton density and fluid sensitive weighting with and \par without fat suppression. Reference is made to prior radiograph dated \par 2/24/2015. \par \par Evaluation of the knee demonstrates mucoid degeneration of the ACL, which is \par intact. The PCL, patellofemoral extensor mechanism and medial collateral \par ligament are normal in appearance. Evaluation of the lateral collateral \par ligament is limited secondary to artifact from hemiarthroplasty of the \par lateral compartment; within these limitations, the rectus femoris and \par fibular collateral ligaments are intact. Very large joint effusion with \par extensive intra-articular synovitis and fatty proliferation. Evaluation of \par the cartilage demonstrates fissuring of the lateral trochlea (image 13, \par axial). Surface irregularity and partial-thickness loss of the weightbearing \par medial femoral condyle; opposing medial tibial plateau is intact. Radial \par tearing of the medial meniscus. Postsurgical changes within Hoffa's fat. 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 \par \par Hemiarthroplasty of the lateral compartment. \par \par Labs quantiferon neg 8/2020\par Hep B neg\par

## 2022-07-01 NOTE — DATA REVIEWED
[Imaging Present] : Present [de-identified] : X-rays today AP lateral sunrise view of both knees show the left knee but the lateral unicompartmental arthroplasty.  Other than that there is some scattered degenerative changes mostly at the lateral side of the right knee.  There is some joint space narrowing on the medial side but still is intact.  There are no other bony lesions seen.  There is no obvious soft tissue shadow seen. 20

## 2022-07-01 NOTE — HISTORY OF PRESENT ILLNESS
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 He feels this started after Enbrel. \par Other joints are feeling good\par Bilateral knee pain R>L, d/w Dr. Thomas at his last visit, who feels he is healing well \par Continues to work with PT\par \par March 4, 2021\par Patient returns for follow up\par Knee feeling better since surgery, has followup with orthopedist tomorrow.\par Was seen by dermatologist, requesting possible change in medication as enbrel helps joints, but still with psoriasis.  Psoriasis in ear, predominantly scalp and scattered throughout skin\par Dermatologist [address]\par Treats with cream for topical \par No side effects from medications\par No pain in the joints, intermittent right knee pain\par Will be traveling to [facility] following second vaccine dose.\par \par June 11, 2021\par Patient returns for follow up\par Patent feeling well, no joint pain at present.  Psoriasis improving but still persists on the scalp and behind the ears, although feels may have worsened with topical steroids.  \par No side effects from humira, completed five doses to date.\par \par July 23, 2021\par Patient returns for follow up \par Patient feeling well although had some left knee discomfort with swelling earlier in this week, now improved.  No NSAIDs, patient does not take additional medications for pain. \par Was seen by orthopedist in early July, stable, no Surgicel intervention at this time\par Reviewed labs with patient\par Patent also had follow up with dermatologist, had biopsy of scalp, no results to date.\par patient requests PT referral\par Reviewed labs with patient\par Reviewed recent xrays with patient. \par \par November 23, 2021\par Patient returns for follow up\par Patient feeling well\par left knee with intermittent pain and swelling, not at present\par Trying to exercise more, but sits often as works as \par No recent infections\par Will arrange for booster\par Will be travelling to [facility] in January\par Patient would like to change back to Enbrel syringe as sometimes feels unsure if receives full dose with pen\par Patient concerned about alopecia as well\par \par July 1, 2022\par Patient returns for follow up\par Patient feeling well, left knee ith pain or stiffness a this time.\par Continues enbrel 50 mg qweekly\par Trying to increase exercise but limited. \par No recent follow up with orthopedist., some intermittent right knee pain, but usually of short duration. \par +skin lesion on the posterior scalp, was not able to see dermatology following last visit, will update referral today for re evaluation. \par No recent infections, no history of covid infections.\par Will update labs today in office

## 2022-07-05 LAB
ALBUMIN SERPL ELPH-MCNC: 4.4 G/DL
ALP BLD-CCNC: 100 U/L
ALT SERPL-CCNC: 23 U/L
ANION GAP SERPL CALC-SCNC: 15 MMOL/L
AST SERPL-CCNC: 23 U/L
BASOPHILS # BLD AUTO: 0.05 K/UL
BASOPHILS NFR BLD AUTO: 0.6 %
BILIRUB SERPL-MCNC: 0.6 MG/DL
BUN SERPL-MCNC: 17 MG/DL
CALCIUM SERPL-MCNC: 9.2 MG/DL
CHLORIDE SERPL-SCNC: 104 MMOL/L
CO2 SERPL-SCNC: 21 MMOL/L
CREAT SERPL-MCNC: 1.03 MG/DL
CRP SERPL-MCNC: 3 MG/L
EGFR: 89 ML/MIN/1.73M2
EOSINOPHIL # BLD AUTO: 0.12 K/UL
EOSINOPHIL NFR BLD AUTO: 1.4 %
ERYTHROCYTE [SEDIMENTATION RATE] IN BLOOD BY WESTERGREN METHOD: 22 MM/HR
GLUCOSE SERPL-MCNC: 107 MG/DL
HCT VFR BLD CALC: 46.1 %
HGB BLD-MCNC: 15.4 G/DL
IMM GRANULOCYTES NFR BLD AUTO: 0.6 %
LYMPHOCYTES # BLD AUTO: 3.91 K/UL
LYMPHOCYTES NFR BLD AUTO: 46.7 %
MAN DIFF?: NORMAL
MCHC RBC-ENTMCNC: 31 PG
MCHC RBC-ENTMCNC: 33.4 GM/DL
MCV RBC AUTO: 92.8 FL
MONOCYTES # BLD AUTO: 0.66 K/UL
MONOCYTES NFR BLD AUTO: 7.9 %
NEUTROPHILS # BLD AUTO: 3.58 K/UL
NEUTROPHILS NFR BLD AUTO: 42.8 %
PLATELET # BLD AUTO: 292 K/UL
POTASSIUM SERPL-SCNC: 4 MMOL/L
PROT SERPL-MCNC: 7.6 G/DL
RBC # BLD: 4.97 M/UL
RBC # FLD: 13.8 %
SODIUM SERPL-SCNC: 140 MMOL/L
WBC # FLD AUTO: 8.37 K/UL

## 2022-08-15 ENCOUNTER — APPOINTMENT (OUTPATIENT)
Dept: OTOLARYNGOLOGY | Facility: CLINIC | Age: 47
End: 2022-08-15

## 2022-08-15 VITALS
WEIGHT: 223 LBS | TEMPERATURE: 97.2 F | DIASTOLIC BLOOD PRESSURE: 83 MMHG | HEIGHT: 66 IN | BODY MASS INDEX: 35.84 KG/M2 | SYSTOLIC BLOOD PRESSURE: 122 MMHG | HEART RATE: 69 BPM

## 2022-08-15 DIAGNOSIS — H92.09 OTALGIA, UNSPECIFIED EAR: ICD-10-CM

## 2022-08-15 PROCEDURE — 99204 OFFICE O/P NEW MOD 45 MIN: CPT

## 2022-08-15 NOTE — PHYSICAL EXAM
[FreeTextEntry1] : Ad: EAC clear, TM intact and mobile, ME clear\par As: EAC clar, some debris proximally removed w suction. TM intact and mobile, ME clear [Midline] : trachea located in midline position [de-identified] : crowded opx [Normal] : no rashes

## 2022-08-15 NOTE — ASSESSMENT
[FreeTextEntry1] : 47M here for initial evaluation. Over the past week, he developed left sided ear pain and itchiness. He saw PCP who said he had left sided ear infection and started oral abx/otic drops (augmentin and cortisporin). Since then, his sx have markedly improved and nearly completely resolved. He is here to ensure everything is ok. 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 On exam, some debris in the left EAC was removed w suction. The rest of the head, neck and ear exam is unremarkable.\par There is no sign of ear infection on exam today - just some left sided debris (residual?) and prior sx have mostly resolved. For now, finish full course meds (he is only on day 6) and keep ear dry. RTO 3 weeks for exam and will get audiometry at that time for baseline given h/o left sided SNHL.

## 2022-08-15 NOTE — HISTORY OF PRESENT ILLNESS
[de-identified] : 47M here for initial evaluation.\par \par Over the past week, he developed left sided ear pain and itchiness. He saw PCP who said he had ear infection and started abx/otic drops (augmentin and cortisporin). Since then, his sx have markedly improved and nearly completely resolved. 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

## 2022-09-15 ENCOUNTER — APPOINTMENT (OUTPATIENT)
Dept: OTOLARYNGOLOGY | Facility: CLINIC | Age: 47
End: 2022-09-15

## 2022-09-15 DIAGNOSIS — J30.89 OTHER ALLERGIC RHINITIS: ICD-10-CM

## 2022-09-15 PROCEDURE — 99214 OFFICE O/P EST MOD 30 MIN: CPT | Mod: 25

## 2022-09-15 PROCEDURE — 31231 NASAL ENDOSCOPY DX: CPT

## 2022-09-15 NOTE — HISTORY OF PRESENT ILLNESS
[de-identified] : 47M here in followup.\par \par He suffers from long standing allergic sx affecting his ear, nose and throat in the fall/spring and feels they are starting now w change in weather. He had been on allergy shots for 4 years with good sx control, but stopped them last year. He remains on zyrtec, flonase and singulair. 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

## 2022-09-15 NOTE — PHYSICAL EXAM
[Nasal Endoscopy Performed] : nasal endoscopy was performed, see procedure section for findings [Midline] : trachea located in midline position [Laryngoscopy Performed] : laryngoscopy was performed, see procedure section for findings [de-identified] : crowded opx, 2-3+ tonsils [Normal] : no rashes [FreeTextEntry1] : Au: EAC clear, TM intact and mobile, ME clear

## 2022-09-15 NOTE — ASSESSMENT
[FreeTextEntry1] : 47M here in followup. For years, he suffers from long standing allergic sx affecting his ear, nose and throat in the fall/spring and feels they are starting now w change in weather. He had been on allergy shots for 4 years with good sx control, but stopped them last year. He remains on zyrtec, flonase and singulair. 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 On exam, \par nasal endoscopy shows rightward septal deviation and inferior turbinate hypertrophy. The rest of the head and neck exam is unremarkable.\par Chronic rhinitis, turbinate hypertrophy and allergies. No e/o sinusitis and exam mostly unremarkable. Recommend continuing current meds and f/u w allergist to restart AIT.

## 2022-09-15 NOTE — PROCEDURE
[FreeTextEntry3] : Nasal Endoscopy:\par rightward septal deviation\par inferior turbinate hypertrophy\par no mucopus or polyps\par choana clear\par \par Fiberoptic Laryngoscopy:\par upper airway patent\par TVF symmetric and mobile\par no masses/lesions

## 2022-10-07 ENCOUNTER — APPOINTMENT (OUTPATIENT)
Dept: ORTHOPEDIC SURGERY | Facility: CLINIC | Age: 47
End: 2022-10-07

## 2022-10-07 VITALS
OXYGEN SATURATION: 96 % | WEIGHT: 223 LBS | BODY MASS INDEX: 35.84 KG/M2 | DIASTOLIC BLOOD PRESSURE: 73 MMHG | HEART RATE: 85 BPM | HEIGHT: 66 IN | SYSTOLIC BLOOD PRESSURE: 145 MMHG

## 2022-10-07 DIAGNOSIS — M25.661 STIFFNESS OF RIGHT KNEE, NOT ELSEWHERE CLASSIFIED: ICD-10-CM

## 2022-10-07 DIAGNOSIS — M25.662 STIFFNESS OF RIGHT KNEE, NOT ELSEWHERE CLASSIFIED: ICD-10-CM

## 2022-10-07 PROCEDURE — 99213 OFFICE O/P EST LOW 20 MIN: CPT

## 2022-10-07 NOTE — PHYSICAL EXAM
[FreeTextEntry1] : On exam the exam the patient is able to stand exam the patient is able to stand and walk.  He does not use any assistive device now.  Both knees have well-healed incisions.  Right knee has 0 to 105 degrees and left knee is 0 to 110 degrees.  He stable to varus and valgus testing.  He has a somewhat slowed gait but is normal.  He has some mild low back pain with no obvious radiculopathy.  He is neurovascularly intact. [General Appearance - Well-Appearing] : Well appearing [General Appearance - Well Nourished] : well nourished [Oriented To Time, Place, And Person] : Oriented to person, place, and time [Impaired Insight] : Insight and judgment were intact [Affect] : The affect was normal. [Mood] : the mood was normal [Sclera] : the sclera and conjunctiva were normal [Neck Cervical Mass (___cm)] : no neck mass was observed [Heart Rate And Rhythm] : heart rate was normal and rhythm regular [] : No respiratory distress [Abdomen Soft] : Soft [Normal Station and Gait] : gait and station were normal [Tenderness] : no tenderness [Swelling] : no swelling [Erythema] : no erythema [Skin Changes - Describe changes:] : No skin changes noted [Incision Healed - Describe:] : Incision is healed ~M [Full ROM Unless otherwise noted:] : Full range of motion unless otherwise noted: [LE  Motor Strength Normal unless otherwise noted:] : 5/5 strength in bilateral lower extemities unless otherwise noted. [Normal] : Sensation intact to light touch. [2+] : left 2+

## 2022-10-07 NOTE — DISCUSSION/SUMMARY
[All Questions Answered] : Patient (and family) had all questions answered to an agreeable level of satisfaction [Interested in Proceeding] : Patient (and family) expressed understanding and interest in proceeding with the plan as outlined [de-identified] : Patient continues to improve with his rheumatologic drugs.  He asked how long he would need to be on but I think this is most likely a decision with rheumatology.  He does have a chronic situation that makes him stiff and I think the medication is helping him.  I like to see him again in 1 year or as needed.  He asked about physical therapy which I think would be appropriate for both his knees and his back referring him for this as well.\par \par If imaging was ordered, the patient was told to make an appointment to review findings right after all imaging is completed.\par \par We discussed risks, benefits and alternatives. Rationale of care was reviewed and all questions were answered. Patient (and family) had all questions answered to her degree of the level of satisfaction. Patient (and family) expressed understanding and interest in proceeding with the plan as outlined.\par \par \par \par \par This note was done with a voice recognition transcription software and any typos are related to this rather than medical error. Surgical risks reviewed. Patient (and family) had all questions answered to an agreeable level of satisfaction. Patient (and family) expressed understanding and interest in proceeding with the plan as outlined.  \par

## 2022-10-07 NOTE — HISTORY OF PRESENT ILLNESS
[FreeTextEntry1] : Patient has been doing well with rheumatology.  I have not seen him in over a year.  He has been able to move appropriately.  He has minimal occasional pain but nothing like he did before. [Stable] : stable [1] : currently ~his/her~ pain is 1 out of 10

## 2022-10-07 NOTE — REASON FOR VISIT
[FreeTextEntry1] : 7/24/2020 - R knee anterior open synovectomy. \par Continued follow-up with knees

## 2023-02-10 ENCOUNTER — RX RENEWAL (OUTPATIENT)
Age: 48
End: 2023-02-10

## 2023-05-10 NOTE — END OF VISIT
Cslled and spoke with patient to tell him to get labs and follow up appt Patient verbalized understanding   [Time Spent: ___ minutes] : I have spent [unfilled] minutes of time on the encounter. [>50% of the face to face encounter time was spent on counseling and/or coordination of care for ___] : Greater than 50% of the face to face encounter time was spent on counseling and/or coordination of care for [unfilled]

## 2023-05-23 ENCOUNTER — RX RENEWAL (OUTPATIENT)
Age: 48
End: 2023-05-23

## 2023-09-13 ENCOUNTER — RX RENEWAL (OUTPATIENT)
Age: 48
End: 2023-09-13

## 2023-11-02 ENCOUNTER — RX RENEWAL (OUTPATIENT)
Age: 48
End: 2023-11-02

## 2023-12-26 ENCOUNTER — RX RENEWAL (OUTPATIENT)
Age: 48
End: 2023-12-26

## 2024-02-14 ENCOUNTER — RX RENEWAL (OUTPATIENT)
Age: 49
End: 2024-02-14

## 2024-03-05 ENCOUNTER — APPOINTMENT (OUTPATIENT)
Dept: RHEUMATOLOGY | Facility: CLINIC | Age: 49
End: 2024-03-05
Payer: MEDICAID

## 2024-03-05 VITALS
HEIGHT: 66 IN | DIASTOLIC BLOOD PRESSURE: 73 MMHG | OXYGEN SATURATION: 97 % | HEART RATE: 86 BPM | SYSTOLIC BLOOD PRESSURE: 112 MMHG | TEMPERATURE: 97.6 F | WEIGHT: 218 LBS | BODY MASS INDEX: 35.03 KG/M2

## 2024-03-05 DIAGNOSIS — L40.9 PSORIASIS, UNSPECIFIED: ICD-10-CM

## 2024-03-05 DIAGNOSIS — L40.50 ARTHROPATHIC PSORIASIS, UNSPECIFIED: ICD-10-CM

## 2024-03-05 PROCEDURE — 99214 OFFICE O/P EST MOD 30 MIN: CPT | Mod: 25

## 2024-03-05 NOTE — PHYSICAL EXAM
[General Appearance - Alert] : alert [General Appearance - In No Acute Distress] : in no acute distress [General Appearance - Well Nourished] : well nourished [General Appearance - Well Developed] : well developed [General Appearance - Well-Appearing] : healthy appearing [Sclera] : the sclera and conjunctiva were normal [Respiration, Rhythm And Depth] : normal respiratory rhythm and effort [Exaggerated Use Of Accessory Muscles For Inspiration] : no accessory muscle use [Abnormal Walk] : normal gait [Edema] : there was no peripheral edema [] : no rash [Impaired Insight] : insight and judgment were intact [Oriented To Time, Place, And Person] : oriented to person, place, and time [Affect] : the affect was normal [Mood] : the mood was normal [Auscultation Breath Sounds / Voice Sounds] : lungs were clear to auscultation bilaterally [FreeTextEntry1] : +posterior scalp dry with scale

## 2024-03-05 NOTE — END OF VISIT
[Time Spent: ___ minutes] : I have spent [unfilled] minutes of time on the encounter. [FreeTextEntry3] : All medical record entries made by the Scribe were at my, Dr. Belinda Stoll MD, direction and personally dictated by me on 03/05/2024. I have reviewed the chart and agree that the record accurately reflects my personal performance of the history, physical exam, assessment and plan. I have also personally directed, reviewed, and agreed with the chart.

## 2024-03-05 NOTE — REVIEW OF SYSTEMS
[Negative] : Heme/Lymph [Joint Stiffness] : joint stiffness [FreeTextEntry9] : intermittently in the knees [de-identified] : scalp psoriasis

## 2024-03-05 NOTE — DATA REVIEWED
[FreeTextEntry1] : Surgical Final Report\par  \par  Final Diagnosis\par  \par  Right knee, synovectomy:\par  - Synovium with marked reactive and degenerative changes,\par  spotty hemosiderin deposition, lymphoid aggregates, and lobulated\par  partially infarcted adipose tissue with focal chronic active\par  inflammation.\par  - GMS stain, negative for fungus (1A).\par  \par  Verified by: Flora Puente M.D.\par  (Electronic Signature)\par  Reported on: 08/03/20 11:36 EDT, [facility], [address] [place] [address]\par  Phone: [phone]   Fax: [phone]\par  _________________________________________________________________\par  \par  Clinical History\par  Lipoma arborescence of right knee\par  \par  Specimen(s) Submitted\par  1     Right knee synovectomy\par  \par  Gross Description\par  The specimen is received in formalin, labeled with the patient's\par  identification and "right knee synovectomy."  It consists of one\par  irregular piece and multiple fragments of brown-tan to yellow-tan\par  and ragged to lobulated soft tissue measuring 11.7 x 5.9 x 2.4 cm\par  in aggregate.  Sectioning reveals two yellow-tan, chalky foci\par  measuring 0.6 and 0.4 cm in greatest dimension.  The remaining\par  cut surfaces are yellow-tan to gray-pink, hemorrhagic, and\par  lobulated to rubbery.  Representative sections are submitted in\par  three cassettes, 1A-1C (1A-larger chalky focus; 1B-smaller chalky\par  focus).\par  Sandra Blum MS, PA([facility]) 07/28/2020 06:18\par  EXAM: MR LWR EXT NON JOINT LT \par  \par  PROCEDURE DATE: 06/23/2020 \par  \par  \par  \par  INTERPRETATION: CLINICAL INDICATION: 45-year-old with weakness. \par  \par  \par  \par  \par  FINDINGS: MRI of the left knee was performed in the axial, coronal and \par  sagittal planes with proton density and fluid sensitive weighting with and \par  without fat suppression. Reference is made to prior radiograph dated \par  2/24/2015. \par  \par  Evaluation of the knee demonstrates mucoid degeneration of the ACL, which is \par  intact. The PCL, patellofemoral extensor mechanism and medial collateral \par  ligament are normal in appearance. Evaluation of the lateral collateral \par  ligament is limited secondary to artifact from hemiarthroplasty of the \par  lateral compartment; within these limitations, the rectus femoris and \par  fibular collateral ligaments are intact. Very large joint effusion with \par  extensive intra-articular synovitis and fatty proliferation. Evaluation of \par  the cartilage demonstrates fissuring of the lateral trochlea (image 13, \par  axial). Surface irregularity and partial-thickness loss of the weightbearing \par  medial femoral condyle; opposing medial tibial plateau is intact. Radial \par  tearing of the medial meniscus. Postsurgical changes within Hoffa's fat. \par  \par  \par  \par  \par  IMPRESSION: Large joint effusion with synovitis and lipomatosis arborescens \par  of the suprapatellar recess. \par  \par  Hemiarthroplasty of the lateral compartment. \par  \par  Labs quantiferon neg 8/2020\par  Hep B neg\par

## 2024-03-05 NOTE — ADDENDUM
[FreeTextEntry1] : I, Cristo Deleon, documented this note as a scribe on behalf of Dr. Belinda Stoll MD on 03/05/2024.

## 2024-03-05 NOTE — ASSESSMENT
[FreeTextEntry1] : 48 year old male presents for follow up of psoriatic arthritis. Patient also with bilateral knee pain 2/2 lipoma arborescens s/p synovectomy in August 2020, doing well, no longer following with orthopedics. Patient previously changed from enbrel to humira (April 27, 2021) for psoriatic arthritis and psoriasis, with initial improvement, however, in August 2021 requested to change back to enbrel with subsequent improvement in symptoms, doing well with no side effects noted, continues enbrel 50 mg qweekly.  At this time, patient still with minimal psoriasis of the scalp, previously evaluated by dermatology in 2022, s/p previous scalp biopsy unclear results. Discussed increasing exercise as works as . Blood work will be updated in office today, including CBC, CMP, CRP, ESR and QuantiFERON. Patient expressed desire for PT, referred to physical therapy for bilateral knees. Discussed orthopedist follow up as well. Follow up in 6 months or sooner as needed.

## 2024-03-05 NOTE — HISTORY OF PRESENT ILLNESS
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 He feels this started after Enbrel.  Other joints are feeling good Bilateral knee pain R>L, d/w Dr. Thomas at his last visit, who feels he is healing well  Continues to work with PT  March 4, 2021 Patient returns for follow up Knee feeling better since surgery, has followup with orthopedist tomorrow. Was seen by dermatologist, requesting possible change in medication as enbrel helps joints, but still with psoriasis.  Psoriasis in ear, predominantly scalp and scattered throughout skin Dermatologist [facility] Treats with cream for topical  No side effects from medications No pain in the joints, intermittent right knee pain Will be traveling to [facility] following second vaccine dose.  June 11, 2021 Patient returns for follow up Patent feeling well, no joint pain at present.  Psoriasis improving but still persists on the scalp and behind the ears, although feels may have worsened with topical steroids.   No side effects from humira, completed five doses to date.  July 23, 2021 Patient returns for follow up  Patient feeling well although had some left knee discomfort with swelling earlier in this week, now improved.  No NSAIDs, patient does not take additional medications for pain.  Was seen by orthopedist in early July, stable, no Surgicel intervention at this time Reviewed labs with patient Patent also had follow up with dermatologist, had biopsy of scalp, no results to date. patient requests PT referral Reviewed labs with patient Reviewed recent xrays with patient.   November 23, 2021 Patient returns for follow up Patient feeling well left knee with intermittent pain and swelling, not at present Trying to exercise more, but sits often as works as  No recent infections Will arrange for booster Will be travelling to [facility] in January Patient would like to change back to Enbrel syringe as sometimes feels unsure if receives full dose with pen Patient concerned about alopecia as well  July 1, 2022 Patient returns for follow up Patient feeling well, left knee ith pain or stiffness a this time. Continues enbrel 50 mg qweekly Trying to increase exercise but limited.  No recent follow up with orthopedist., some intermittent right knee pain, but usually of short duration.  +skin lesion on the posterior scalp, was not able to see dermatology following last visit, will update referral today for re evaluation.  No recent infections, no history of covid infections. Will update labs today in office  March 5, 2024 Patient returns for follow up of psoriatic arthritis  ID #[id number] Patient is doing well Knees doing well, no pain or swelling at this time No other joint pains Reports persistent psoriasis in scalp, uses topicals Continues Enbrel weekly, no side effects noted Has not been evaluated by orthopedist recently Since last visit, no new medical issues or medications No recent infections Sometimes takes pantoprazole  Rarely takes cholesterol medication Patient exercises sometimes Patient expressed desire for PT, referred to physical therapy for bilateral knees

## 2024-03-06 LAB
ALBUMIN SERPL ELPH-MCNC: 4.5 G/DL
ALP BLD-CCNC: 97 U/L
ALT SERPL-CCNC: 27 U/L
ANION GAP SERPL CALC-SCNC: 13 MMOL/L
AST SERPL-CCNC: 23 U/L
BASOPHILS # BLD AUTO: 0.04 K/UL
BASOPHILS NFR BLD AUTO: 0.5 %
BILIRUB SERPL-MCNC: 0.5 MG/DL
BUN SERPL-MCNC: 17 MG/DL
CALCIUM SERPL-MCNC: 9.6 MG/DL
CHLORIDE SERPL-SCNC: 104 MMOL/L
CO2 SERPL-SCNC: 22 MMOL/L
CREAT SERPL-MCNC: 1.13 MG/DL
CRP SERPL-MCNC: 9 MG/L
EGFR: 79 ML/MIN/1.73M2
EOSINOPHIL # BLD AUTO: 0.14 K/UL
EOSINOPHIL NFR BLD AUTO: 1.7 %
ERYTHROCYTE [SEDIMENTATION RATE] IN BLOOD BY WESTERGREN METHOD: 33 MM/HR
GLUCOSE SERPL-MCNC: 88 MG/DL
HCT VFR BLD CALC: 45.8 %
HGB BLD-MCNC: 15.7 G/DL
IMM GRANULOCYTES NFR BLD AUTO: 0.4 %
LYMPHOCYTES # BLD AUTO: 2.94 K/UL
LYMPHOCYTES NFR BLD AUTO: 36 %
MAN DIFF?: NORMAL
MCHC RBC-ENTMCNC: 31.2 PG
MCHC RBC-ENTMCNC: 34.3 GM/DL
MCV RBC AUTO: 90.9 FL
MONOCYTES # BLD AUTO: 0.72 K/UL
MONOCYTES NFR BLD AUTO: 8.8 %
NEUTROPHILS # BLD AUTO: 4.29 K/UL
NEUTROPHILS NFR BLD AUTO: 52.6 %
PLATELET # BLD AUTO: 336 K/UL
POTASSIUM SERPL-SCNC: 3.9 MMOL/L
PROT SERPL-MCNC: 7.4 G/DL
RBC # BLD: 5.04 M/UL
RBC # FLD: 13.8 %
SODIUM SERPL-SCNC: 138 MMOL/L
WBC # FLD AUTO: 8.16 K/UL

## 2024-03-10 LAB
M TB IFN-G BLD-IMP: NEGATIVE
QUANTIFERON TB PLUS MITOGEN MINUS NIL: >10 IU/ML
QUANTIFERON TB PLUS NIL: 0.12 IU/ML
QUANTIFERON TB PLUS TB1 MINUS NIL: 0 IU/ML
QUANTIFERON TB PLUS TB2 MINUS NIL: 0 IU/ML

## 2024-04-08 RX ORDER — ETANERCEPT 50 MG/ML
50 SOLUTION SUBCUTANEOUS
Qty: 1 | Refills: 5 | Status: ACTIVE | COMMUNITY
Start: 2021-11-23 | End: 1900-01-01

## 2024-09-24 ENCOUNTER — RX RENEWAL (OUTPATIENT)
Age: 49
End: 2024-09-24

## 2024-12-17 ENCOUNTER — RX RENEWAL (OUTPATIENT)
Age: 49
End: 2024-12-17

## 2025-01-08 ENCOUNTER — APPOINTMENT (OUTPATIENT)
Dept: RHEUMATOLOGY | Facility: CLINIC | Age: 50
End: 2025-01-08
Payer: MEDICAID

## 2025-01-08 ENCOUNTER — NON-APPOINTMENT (OUTPATIENT)
Age: 50
End: 2025-01-08

## 2025-01-08 VITALS
TEMPERATURE: 97.8 F | DIASTOLIC BLOOD PRESSURE: 82 MMHG | HEIGHT: 66 IN | WEIGHT: 228 LBS | BODY MASS INDEX: 36.64 KG/M2 | HEART RATE: 94 BPM | SYSTOLIC BLOOD PRESSURE: 134 MMHG | OXYGEN SATURATION: 96 %

## 2025-01-08 DIAGNOSIS — L40.50 ARTHROPATHIC PSORIASIS, UNSPECIFIED: ICD-10-CM

## 2025-01-08 DIAGNOSIS — L40.9 PSORIASIS, UNSPECIFIED: ICD-10-CM

## 2025-01-08 PROCEDURE — 99214 OFFICE O/P EST MOD 30 MIN: CPT | Mod: 25

## 2025-01-08 RX ORDER — DICLOFENAC SODIUM 10 MG/G
1 GEL TOPICAL
Qty: 1 | Refills: 2 | Status: ACTIVE | COMMUNITY
Start: 2025-01-08 | End: 1900-01-01

## 2025-01-09 LAB
ALBUMIN SERPL ELPH-MCNC: 4.6 G/DL
ALP BLD-CCNC: 92 U/L
ALT SERPL-CCNC: 28 U/L
ANION GAP SERPL CALC-SCNC: 14 MMOL/L
AST SERPL-CCNC: 20 U/L
BASOPHILS # BLD AUTO: 0.04 K/UL
BASOPHILS NFR BLD AUTO: 0.4 %
BILIRUB SERPL-MCNC: 0.3 MG/DL
BUN SERPL-MCNC: 18 MG/DL
CALCIUM SERPL-MCNC: 9.6 MG/DL
CHLORIDE SERPL-SCNC: 106 MMOL/L
CO2 SERPL-SCNC: 22 MMOL/L
CREAT SERPL-MCNC: 1.07 MG/DL
CRP SERPL-MCNC: 7 MG/L
EGFR: 84 ML/MIN/1.73M2
EOSINOPHIL # BLD AUTO: 0.14 K/UL
EOSINOPHIL NFR BLD AUTO: 1.4 %
ERYTHROCYTE [SEDIMENTATION RATE] IN BLOOD BY WESTERGREN METHOD: 39 MM/HR
GLUCOSE SERPL-MCNC: 89 MG/DL
HCT VFR BLD CALC: 46.4 %
HGB BLD-MCNC: 14.8 G/DL
IMM GRANULOCYTES NFR BLD AUTO: 0.4 %
LYMPHOCYTES # BLD AUTO: 4.14 K/UL
LYMPHOCYTES NFR BLD AUTO: 40.1 %
MAN DIFF?: NORMAL
MCHC RBC-ENTMCNC: 30.5 PG
MCHC RBC-ENTMCNC: 31.9 G/DL
MCV RBC AUTO: 95.7 FL
MONOCYTES # BLD AUTO: 0.61 K/UL
MONOCYTES NFR BLD AUTO: 5.9 %
NEUTROPHILS # BLD AUTO: 5.36 K/UL
NEUTROPHILS NFR BLD AUTO: 51.8 %
PLATELET # BLD AUTO: 332 K/UL
POTASSIUM SERPL-SCNC: 4.2 MMOL/L
PROT SERPL-MCNC: 7.7 G/DL
RBC # BLD: 4.85 M/UL
RBC # FLD: 13.5 %
SODIUM SERPL-SCNC: 141 MMOL/L
WBC # FLD AUTO: 10.33 K/UL

## 2025-02-18 ENCOUNTER — RX RENEWAL (OUTPATIENT)
Age: 50
End: 2025-02-18

## 2025-07-09 ENCOUNTER — APPOINTMENT (OUTPATIENT)
Dept: RHEUMATOLOGY | Facility: CLINIC | Age: 50
End: 2025-07-09
Payer: MEDICAID

## 2025-07-09 VITALS
SYSTOLIC BLOOD PRESSURE: 114 MMHG | HEART RATE: 67 BPM | OXYGEN SATURATION: 95 % | HEIGHT: 66 IN | BODY MASS INDEX: 36.64 KG/M2 | DIASTOLIC BLOOD PRESSURE: 73 MMHG | WEIGHT: 228 LBS

## 2025-07-09 PROCEDURE — 99214 OFFICE O/P EST MOD 30 MIN: CPT | Mod: 25

## 2025-07-10 LAB
ALBUMIN SERPL ELPH-MCNC: 4.6 G/DL
ALP BLD-CCNC: 85 U/L
ALT SERPL-CCNC: 28 U/L
ANION GAP SERPL CALC-SCNC: 16 MMOL/L
AST SERPL-CCNC: 26 U/L
BASOPHILS # BLD AUTO: 0.03 K/UL
BASOPHILS NFR BLD AUTO: 0.3 %
BILIRUB SERPL-MCNC: 0.8 MG/DL
BUN SERPL-MCNC: 14 MG/DL
CALCIUM SERPL-MCNC: 9.9 MG/DL
CHLORIDE SERPL-SCNC: 106 MMOL/L
CO2 SERPL-SCNC: 19 MMOL/L
CREAT SERPL-MCNC: 1.04 MG/DL
EGFRCR SERPLBLD CKD-EPI 2021: 86 ML/MIN/1.73M2
EOSINOPHIL # BLD AUTO: 0.2 K/UL
EOSINOPHIL NFR BLD AUTO: 2 %
GLUCOSE SERPL-MCNC: 88 MG/DL
HCT VFR BLD CALC: 44.5 %
HGB BLD-MCNC: 14.6 G/DL
IMM GRANULOCYTES NFR BLD AUTO: 0.5 %
LYMPHOCYTES # BLD AUTO: 3.99 K/UL
LYMPHOCYTES NFR BLD AUTO: 39 %
MAN DIFF?: NORMAL
MCHC RBC-ENTMCNC: 31.1 PG
MCHC RBC-ENTMCNC: 32.8 G/DL
MCV RBC AUTO: 94.9 FL
MONOCYTES # BLD AUTO: 0.71 K/UL
MONOCYTES NFR BLD AUTO: 6.9 %
NEUTROPHILS # BLD AUTO: 5.25 K/UL
NEUTROPHILS NFR BLD AUTO: 51.3 %
PLATELET # BLD AUTO: 314 K/UL
POTASSIUM SERPL-SCNC: 4.2 MMOL/L
PROT SERPL-MCNC: 7.5 G/DL
RBC # BLD: 4.69 M/UL
RBC # FLD: 14.1 %
SODIUM SERPL-SCNC: 141 MMOL/L
WBC # FLD AUTO: 10.23 K/UL

## 2025-07-13 LAB
M TB IFN-G BLD-IMP: NEGATIVE
QUANTIFERON TB PLUS MITOGEN MINUS NIL: >10 IU/ML
QUANTIFERON TB PLUS NIL: 0.03 IU/ML
QUANTIFERON TB PLUS TB1 MINUS NIL: 0 IU/ML
QUANTIFERON TB PLUS TB2 MINUS NIL: 0 IU/ML